# Patient Record
Sex: FEMALE | Race: WHITE | NOT HISPANIC OR LATINO | Employment: FULL TIME | URBAN - METROPOLITAN AREA
[De-identification: names, ages, dates, MRNs, and addresses within clinical notes are randomized per-mention and may not be internally consistent; named-entity substitution may affect disease eponyms.]

---

## 2018-01-16 ENCOUNTER — OFFICE VISIT (OUTPATIENT)
Dept: FAMILY MEDICINE CLINIC | Facility: CLINIC | Age: 33
End: 2018-01-16

## 2018-01-16 VITALS
DIASTOLIC BLOOD PRESSURE: 80 MMHG | HEIGHT: 64 IN | OXYGEN SATURATION: 98 % | SYSTOLIC BLOOD PRESSURE: 124 MMHG | BODY MASS INDEX: 42.85 KG/M2 | TEMPERATURE: 98 F | WEIGHT: 251 LBS | HEART RATE: 78 BPM

## 2018-01-16 PROBLEM — N39.0 RECURRENT UTI: Status: ACTIVE | Noted: 2018-01-16

## 2018-01-16 PROCEDURE — 99203 OFFICE O/P NEW LOW 30 MIN: CPT | Performed by: FAMILY MEDICINE

## 2018-01-16 NOTE — PROGRESS NOTES
Subjective   Danuta Queen is a 32 y.o. female.     Chief Complaint   Patient presents with   • Establish Care     new pt  establishing today in office   • Snoring     pt since last year is been having snoring and would like to check on that        HPI     Pt is a pleasant 32 YOF here Status care.  She has no prior medical history aside from the city.  She has a BMI of 43.  She states that she has gradually been gaining weight over the last 2 years.  Her greatest concern is snoring, she denies apnea, she denies daytime sleepiness.  She wakes up feeling that she had restorative sleep and does not wake up during the night.  She does affirm having a poor diet and not exercising.  She is aware changes she needs to make but has not done them.  She has never been pregnant, IUD in place for contraception.    The following portions of the patient's history were reviewed and updated as appropriate: allergies, current medications, past family history, past medical history, past social history, past surgical history and problem list.    Review of Systems   Constitutional: Negative for fever and unexpected weight change.   HENT: Negative for dental problem.    Respiratory: Negative for shortness of breath.    Cardiovascular: Negative for chest pain.   Gastrointestinal: Negative for blood in stool.   Genitourinary: Negative for dysuria.   Skin: Negative for rash.   Allergic/Immunologic: Negative for environmental allergies.   Neurological: Negative for syncope.   Psychiatric/Behavioral: The patient is not nervous/anxious.    All other systems reviewed and are negative.      Objective  Vitals:    01/16/18 1513   BP: 124/80   Pulse: 78   Temp: 98 °F (36.7 °C)   SpO2: 98%        Physical Exam   Constitutional: She is oriented to person, place, and time. She appears well-developed and well-nourished. No distress.   HENT:   Head: Normocephalic.   Nose: Nose normal.   Eyes: EOM are normal.   Cardiovascular: Normal rate, regular  rhythm, normal heart sounds and intact distal pulses.    No murmur heard.  Pulmonary/Chest: Effort normal and breath sounds normal. No respiratory distress.   Musculoskeletal: Normal range of motion.   Neurological: She is alert and oriented to person, place, and time.   Skin: Skin is warm and dry. No rash noted.   Psychiatric: She has a normal mood and affect. Her behavior is normal. Judgment and thought content normal.   Nursing note and vitals reviewed.        Current Outpatient Prescriptions:   •  levonorgestrel (MIRENA) 20 MCG/24HR IUD, by Intrauterine route., Disp: , Rfl:     Procedures    Lab Results (most recent)     None          Assessment/Plan   Danuta was seen today for establish care and snoring.    Diagnoses and all orders for this visit:    BMI 40.0-44.9, adult  -     Comprehensive Metabolic Panel  -     Lipid Panel  -     TSH Rfx On Abnormal To Free T4  -     Vitamin D 25 Hydroxy      Get baseline labwork as above for obesity, I do not think a sleep study is indicated at this time as she has had normal sleep patterns and does not wake up sleepy.  We discussed diet and exercise at length, patient seemed open but not ready to make changes at this time.    25 minutes was spent of the 30 minute visit in direct counseling and coordination of care regarding:   Encounter Diagnosis   Name Primary?   • BMI 40.0-44.9, adult Yes         Return in about 3 months (around 4/16/2018) for Recheck obesity .      Marjorie Owens MD

## 2018-01-19 LAB
25(OH)D3+25(OH)D2 SERPL-MCNC: 21.2 NG/ML (ref 30–100)
ALBUMIN SERPL-MCNC: 4.1 G/DL (ref 3.5–5.2)
ALBUMIN/GLOB SERPL: 1.4 G/DL
ALP SERPL-CCNC: 99 U/L (ref 39–117)
ALT SERPL-CCNC: 20 U/L (ref 1–33)
AST SERPL-CCNC: 18 U/L (ref 1–32)
BILIRUB SERPL-MCNC: 0.5 MG/DL (ref 0.1–1.2)
BUN SERPL-MCNC: 9 MG/DL (ref 6–20)
BUN/CREAT SERPL: 12.9 (ref 7–25)
CALCIUM SERPL-MCNC: 9.3 MG/DL (ref 8.6–10.5)
CHLORIDE SERPL-SCNC: 104 MMOL/L (ref 98–107)
CHOLEST SERPL-MCNC: 187 MG/DL (ref 0–200)
CO2 SERPL-SCNC: 24.8 MMOL/L (ref 22–29)
CREAT SERPL-MCNC: 0.7 MG/DL (ref 0.57–1)
GLOBULIN SER CALC-MCNC: 3 GM/DL
GLUCOSE SERPL-MCNC: 92 MG/DL (ref 65–99)
HDLC SERPL-MCNC: 64 MG/DL (ref 40–60)
LDLC SERPL CALC-MCNC: 105 MG/DL (ref 0–100)
POTASSIUM SERPL-SCNC: 4.5 MMOL/L (ref 3.5–5.2)
PROT SERPL-MCNC: 7.1 G/DL (ref 6–8.5)
SODIUM SERPL-SCNC: 143 MMOL/L (ref 136–145)
TRIGL SERPL-MCNC: 92 MG/DL (ref 0–150)
TSH SERPL DL<=0.005 MIU/L-ACNC: 1.72 MIU/ML (ref 0.27–4.2)
VLDLC SERPL CALC-MCNC: 18.4 MG/DL (ref 5–40)

## 2018-01-22 DIAGNOSIS — E55.9 VITAMIN D DEFICIENCY: Primary | ICD-10-CM

## 2018-02-27 ENCOUNTER — OFFICE VISIT (OUTPATIENT)
Dept: FAMILY MEDICINE CLINIC | Facility: CLINIC | Age: 33
End: 2018-02-27

## 2018-02-27 VITALS
TEMPERATURE: 98.6 F | DIASTOLIC BLOOD PRESSURE: 88 MMHG | SYSTOLIC BLOOD PRESSURE: 130 MMHG | HEART RATE: 113 BPM | OXYGEN SATURATION: 98 % | HEIGHT: 64 IN | BODY MASS INDEX: 42 KG/M2 | WEIGHT: 246 LBS

## 2018-02-27 DIAGNOSIS — H66.001 ACUTE SUPPURATIVE OTITIS MEDIA OF RIGHT EAR WITHOUT SPONTANEOUS RUPTURE OF TYMPANIC MEMBRANE, RECURRENCE NOT SPECIFIED: Primary | ICD-10-CM

## 2018-02-27 DIAGNOSIS — H60.331 ACUTE SWIMMER'S EAR OF RIGHT SIDE: ICD-10-CM

## 2018-02-27 PROCEDURE — 99213 OFFICE O/P EST LOW 20 MIN: CPT | Performed by: FAMILY MEDICINE

## 2018-02-27 RX ORDER — CIPROFLOXACIN AND DEXAMETHASONE 3; 1 MG/ML; MG/ML
4 SUSPENSION/ DROPS AURICULAR (OTIC) 2 TIMES DAILY
Qty: 10 ML | Refills: 0 | Status: SHIPPED | OUTPATIENT
Start: 2018-02-27 | End: 2018-03-05

## 2018-02-27 RX ORDER — AMOXICILLIN 875 MG/1
875 TABLET, COATED ORAL 2 TIMES DAILY
Qty: 20 TABLET | Refills: 0 | Status: SHIPPED | OUTPATIENT
Start: 2018-02-27 | End: 2018-03-09

## 2018-02-27 NOTE — PROGRESS NOTES
Subjective   Danuta Queen is a 32 y.o. female.     Chief Complaint   Patient presents with   • Sore Throat     since  saturday  has sore throat and yeasterday has some ear pain on right side some dizzy spells       URI    This is a new problem. Episode onset: 4 days. There has been no fever. Associated symptoms include coughing, ear pain, headaches, sinus pain, sneezing and a sore throat. Pertinent negatives include no nausea. Associated symptoms comments: R ear pain, entire face with sinus pressure that is improving. . She has tried decongestant (Mucinex, sudafed sinus headache) for the symptoms. The treatment provided mild relief.        The following portions of the patient's history were reviewed and updated as appropriate: allergies, current medications, past family history, past medical history, past social history, past surgical history and problem list.    Review of Systems   Constitutional: Negative for fatigue and fever.   HENT: Positive for ear pain, sinus pain, sneezing, sore throat and trouble swallowing.    Respiratory: Positive for cough.    Gastrointestinal: Negative for nausea.   Neurological: Positive for headaches.   All other systems reviewed and are negative.      Objective  Vitals:    02/27/18 1401   BP: 130/88   Pulse: 113   Temp: 98.6 °F (37 °C)   SpO2: 98%        Physical Exam   Constitutional: She is oriented to person, place, and time. She appears well-developed and well-nourished. No distress.   HENT:   Head: Normocephalic.   Left Ear: External ear normal.   Nose: Nose normal.   Bulging serous R TM, no perforation, erythema.  Ear canal red, irritated with some bleeding.    Eyes: EOM are normal.   Cardiovascular: Normal rate, regular rhythm, normal heart sounds and intact distal pulses.    No murmur heard.  Pulmonary/Chest: Effort normal and breath sounds normal. No respiratory distress.   Musculoskeletal: Normal range of motion.   Neurological: She is alert and oriented to person,  place, and time.   Skin: Skin is warm and dry. No rash noted.   Psychiatric: She has a normal mood and affect. Her behavior is normal. Judgment and thought content normal.   Nursing note and vitals reviewed.        Current Outpatient Prescriptions:   •  amoxicillin (AMOXIL) 875 MG tablet, Take 1 tablet by mouth 2 (Two) Times a Day for 10 days., Disp: 20 tablet, Rfl: 0  •  ciprofloxacin-dexamethasone (CIPRODEX) 0.3-0.1 % otic suspension, Administer 4 drops into ears 2 (Two) Times a Day. 4 drops in ear(s) BID for 7 days, Disp: 10 mL, Rfl: 0  •  levonorgestrel (MIRENA) 20 MCG/24HR IUD, by Intrauterine route., Disp: , Rfl:     Procedures    Lab Results (most recent)     None        [facility][  Assessment/Plan   Danuta was seen today for sore throat.    Diagnoses and all orders for this visit:    Acute suppurative otitis media of right ear without spontaneous rupture of tympanic membrane, recurrence not specified  -     amoxicillin (AMOXIL) 875 MG tablet; Take 1 tablet by mouth 2 (Two) Times a Day for 10 days.    Acute swimmer's ear of right side  -     ciprofloxacin-dexamethasone (CIPRODEX) 0.3-0.1 % otic suspension; Administer 4 drops into ears 2 (Two) Times a Day. 4 drops in ear(s) BID for 7 days      Otitis media and externa on the right side.  Treatment as above.  Follow-up in 2 weeks for reevaluation.  She does have some ringing although no overt hearing loss.    Return in about 2 weeks (around 3/13/2018) for Recheck otitis media .      Marjorie Owens MD

## 2018-03-05 ENCOUNTER — TELEPHONE (OUTPATIENT)
Dept: FAMILY MEDICINE CLINIC | Facility: CLINIC | Age: 33
End: 2018-03-05

## 2018-03-05 DIAGNOSIS — H60.391 OTHER INFECTIVE ACUTE OTITIS EXTERNA OF RIGHT EAR: Primary | ICD-10-CM

## 2018-03-05 RX ORDER — OFLOXACIN 3 MG/ML
5 SOLUTION AURICULAR (OTIC) DAILY
Qty: 5 ML | Refills: 0 | Status: SHIPPED | OUTPATIENT
Start: 2018-03-05 | End: 2018-03-12

## 2018-03-05 NOTE — TELEPHONE ENCOUNTER
Ms. Queen is on her sixth day of taking the amoxicillin. She has not seen any improvement. Ears are still achy. Patient could not afford the Ciprodex medication, it was over two hundred dollars. Is there anything else that can be prescribed for this patient?

## 2018-03-05 NOTE — TELEPHONE ENCOUNTER
Called in script for patient and I called her to give instructions to know how to use - pt verbalized understanding.  Patient to use Ofloxacin and then and then one pea-sized amount of triamcinolone  ointment in the right ear twice a day or 3 times a day.  If not improving in 4-5 days, the patient is instructed to follow-up with me.  I would need to reevaluate.  Potentially send to ENT depending on findings.  No need to call pt back at this time.

## 2018-03-09 ENCOUNTER — OFFICE VISIT (OUTPATIENT)
Dept: FAMILY MEDICINE CLINIC | Facility: CLINIC | Age: 33
End: 2018-03-09

## 2018-03-09 VITALS
TEMPERATURE: 98.9 F | SYSTOLIC BLOOD PRESSURE: 110 MMHG | HEART RATE: 89 BPM | OXYGEN SATURATION: 98 % | HEIGHT: 64 IN | DIASTOLIC BLOOD PRESSURE: 72 MMHG | BODY MASS INDEX: 41.66 KG/M2 | WEIGHT: 244 LBS

## 2018-03-09 DIAGNOSIS — H65.112 ACUTE MUCOID OTITIS MEDIA OF LEFT EAR: Primary | ICD-10-CM

## 2018-03-09 PROCEDURE — 99213 OFFICE O/P EST LOW 20 MIN: CPT | Performed by: FAMILY MEDICINE

## 2018-03-09 RX ORDER — PREDNISONE 20 MG/1
40 TABLET ORAL DAILY
Qty: 10 TABLET | Refills: 0 | Status: SHIPPED | OUTPATIENT
Start: 2018-03-09 | End: 2018-03-14

## 2018-03-09 RX ORDER — CEFDINIR 300 MG/1
300 CAPSULE ORAL 2 TIMES DAILY
Qty: 20 CAPSULE | Refills: 0 | Status: SHIPPED | OUTPATIENT
Start: 2018-03-09 | End: 2018-03-19

## 2018-03-09 NOTE — PROGRESS NOTES
Subjective   Danuta Queen is a 32 y.o. female.     Chief Complaint   Patient presents with   • Otitis Media     right ear no fgetting any better and has symptoms in left ear as well        Earache    There is pain in both ears. This is a new (not improved since last apt, L side now hurting as welll) problem. The problem occurs constantly. The problem has been unchanged. There has been no fever. Associated symptoms include coughing, ear discharge and hearing loss. Pertinent negatives include no neck pain, rhinorrhea or sore throat. She has tried antibiotics (Amoxacillin ) for the symptoms. The treatment provided no relief. There is no history of a chronic ear infection, hearing loss or a tympanostomy tube.        The following portions of the patient's history were reviewed and updated as appropriate: allergies, current medications, past family history, past medical history, past social history, past surgical history and problem list.    Review of Systems   Constitutional: Negative for fever.   HENT: Positive for congestion, ear discharge, ear pain and hearing loss. Negative for rhinorrhea and sore throat.    Respiratory: Positive for cough.    Musculoskeletal: Negative for neck pain.   All other systems reviewed and are negative.      Objective  Vitals:    03/09/18 1444   BP: 110/72   Pulse: 89   Temp: 98.9 °F (37.2 °C)   SpO2: 98%        Physical Exam   Constitutional: She is oriented to person, place, and time. She appears well-developed and well-nourished. No distress.   HENT:   Head: Normocephalic.   Nose: Nose normal.   Bilateral ear canals look significantly improved from last evaluation.  Right TM is not bulging, slightly red with slight maxime colored.  Left side with bulging and erythema.  Bony landmarks not visualized on the left.   Eyes: EOM are normal.   Cardiovascular: Normal rate, regular rhythm, normal heart sounds and intact distal pulses.    No murmur heard.  Pulmonary/Chest: Effort normal and  breath sounds normal. No respiratory distress.   Musculoskeletal: Normal range of motion.   Neurological: She is alert and oriented to person, place, and time.   Skin: Skin is warm and dry. No rash noted.   Psychiatric: She has a normal mood and affect. Her behavior is normal. Judgment and thought content normal.   Nursing note and vitals reviewed.        Current Outpatient Prescriptions:   •  ofloxacin (FLOXIN) 0.3 % otic solution, Administer 5 drops to the right ear Daily for 7 days., Disp: 5 mL, Rfl: 0  •  triamcinolone (KENALOG) 0.1 % ointment, Apply  topically 3 (Three) Times a Day., Disp: 15 g, Rfl: 0  •  amoxicillin (AMOXIL) 875 MG tablet, Take 1 tablet by mouth 2 (Two) Times a Day for 10 days., Disp: 20 tablet, Rfl: 0  •  cefdinir (OMNICEF) 300 MG capsule, Take 1 capsule by mouth 2 (Two) Times a Day for 10 days., Disp: 20 capsule, Rfl: 0  •  levonorgestrel (MIRENA) 20 MCG/24HR IUD, by Intrauterine route., Disp: , Rfl:   •  predniSONE (DELTASONE) 20 MG tablet, Take 2 tablets by mouth Daily for 5 days., Disp: 10 tablet, Rfl: 0    Procedures    Lab Results (most recent)     None          Assessment/Plan   Danuta was seen today for otitis media.    Diagnoses and all orders for this visit:    Acute mucoid otitis media of left ear  -     cefdinir (OMNICEF) 300 MG capsule; Take 1 capsule by mouth 2 (Two) Times a Day for 10 days.  -     predniSONE (DELTASONE) 20 MG tablet; Take 2 tablets by mouth Daily for 5 days.      Patient with now left-sided otitis media, start Cefdinir.  Prednisone for fluid in the ear and hearing loss.  Patient will follow up if not improving, may need referral to ENT if not improving after second course of antibiotics.    Return if symptoms worsen or fail to improve.      Marjorie Owens MD

## 2018-03-15 ENCOUNTER — TELEPHONE (OUTPATIENT)
Dept: FAMILY MEDICINE CLINIC | Facility: CLINIC | Age: 33
End: 2018-03-15

## 2018-03-15 DIAGNOSIS — H66.90 SUBACUTE OTITIS MEDIA, UNSPECIFIED OTITIS MEDIA TYPE: Primary | ICD-10-CM

## 2018-03-15 NOTE — TELEPHONE ENCOUNTER
Pt is not having any improvement with the antibiotics. Her inner ear is still full of pain and pressure and her hearing is very fussy. Is a ENT the next step?

## 2018-03-27 ENCOUNTER — RESULTS ENCOUNTER (OUTPATIENT)
Dept: FAMILY MEDICINE CLINIC | Facility: CLINIC | Age: 33
End: 2018-03-27

## 2018-03-27 DIAGNOSIS — E55.9 VITAMIN D DEFICIENCY: ICD-10-CM

## 2019-01-02 ENCOUNTER — OFFICE VISIT (OUTPATIENT)
Dept: FAMILY MEDICINE CLINIC | Facility: CLINIC | Age: 34
End: 2019-01-02

## 2019-01-02 VITALS
DIASTOLIC BLOOD PRESSURE: 86 MMHG | WEIGHT: 252 LBS | BODY MASS INDEX: 43.02 KG/M2 | SYSTOLIC BLOOD PRESSURE: 126 MMHG | OXYGEN SATURATION: 98 % | HEART RATE: 89 BPM | TEMPERATURE: 98.7 F | HEIGHT: 64 IN

## 2019-01-02 DIAGNOSIS — R31.0 GROSS HEMATURIA: Primary | ICD-10-CM

## 2019-01-02 PROCEDURE — 99214 OFFICE O/P EST MOD 30 MIN: CPT | Performed by: FAMILY MEDICINE

## 2019-01-02 NOTE — PROGRESS NOTES
Danuta Queen is a 33 y.o. female.     Chief Complaint   Patient presents with   • Blood in Urine     since friday saw blood in urine went to  everthing was ok thy did culture came back negative andUA negative as well        HPI     Pt is a pleasant 33 y.o. YO female here for hematuria.     Started having hematuria 5 days ago, gross hematuria with some mild flank pain,, dysuria, N/v/f or other symptoms.  Not a smoker, no hx of malignancy in the family.  No trauma/ cause for hematuria. She has had a history of uti in the past with hematuria - went to [facility] and treated with Macrobid, urine culture negative.     The following portions of the patient's history were reviewed and updated as appropriate: allergies, current medications, past family history, past medical history, past social history, past surgical history and problem list.    Review of Systems   Constitutional: Negative.    Eyes: Negative.    Respiratory: Negative.    Cardiovascular: Negative.    Genitourinary: Positive for difficulty urinating, dysuria, hematuria and urgency.   Musculoskeletal: Positive for back pain.   Allergic/Immunologic: Negative.    Neurological: Negative.    Hematological: Negative.    Psychiatric/Behavioral: Negative.        Objective  Vitals:    01/02/19 1504   BP: 126/86   Pulse: 89   Temp: 98.7 °F (37.1 °C)   SpO2: 98%        Physical Exam   Constitutional: She is oriented to person, place, and time. She appears well-developed and well-nourished. No distress.   HENT:   Head: Normocephalic.   Nose: Nose normal.   Eyes: EOM are normal. No scleral icterus.   Pulmonary/Chest: Effort normal. No respiratory distress.   Abdominal: Soft. There is tenderness.   Mild suprapubic tenderness.   Musculoskeletal: Normal range of motion.   Mild tenderness on the right side of the back below the CV region.   Neurological: She is alert and oriented to person, place, and time.   Skin: Skin is warm and dry. No rash noted.   Psychiatric: She  has a normal mood and affect. Her behavior is normal. Judgment and thought content normal.   Nursing note and vitals reviewed.        Current Outpatient Medications:   •  levonorgestrel (MIRENA) 20 MCG/24HR IUD, by Intrauterine route., Disp: , Rfl:   •  nitrofurantoin, macrocrystal-monohydrate, (MACROBID) 100 MG capsule, Take 1 capsule by mouth 2 (Two) Times a Day., Disp: 14 capsule, Rfl: 0  •  phenazopyridine (PYRIDIUM) 200 MG tablet, Take 1 tablet by mouth 3 (Three) Times a Day As Needed for bladder spasms., Disp: 6 tablet, Rfl: 0  •  triamcinolone (KENALOG) 0.1 % ointment, Apply  topically 3 (Three) Times a Day., Disp: 15 g, Rfl: 0    Procedures    Lab Results (most recent)     None        No visits with results within 1 Day(s) from this visit.   Latest known visit with results is:   Admission on 12/29/2018, Discharged on 12/29/2018   Component Date Value Ref Range Status   • Color 12/29/2018 Other* Yellow, Straw, Dark Yellow, Gini Final   • Clarity, UA 12/29/2018 Slightly Cloudy* Clear Final   • Glucose, UA 12/29/2018 Negative  Negative, 1000 mg/dL (3+) mg/dL Final   • Bilirubin 12/29/2018 Negative  Negative Final   • Ketones, UA 12/29/2018 Negative  Negative Final   • Specific Gravity  12/29/2018 1.025  1.005 - 1.030 Final   • Blood, UA 12/29/2018 Large* Negative Final   • pH, Urine 12/29/2018 6.5  5.0 - 8.0 Final   • Protein, POC 12/29/2018 100 mg/dL* Negative mg/dL Final   • Urobilinogen, UA 12/29/2018 Normal  Normal Final   • Nitrite, UA 12/29/2018 Negative  Negative Final   • Leukocytes 12/29/2018 Trace* Negative Final   • Urine Culture 12/29/2018 Final report   Final   • Result 1 12/29/2018 Comment   Final    Culture shows less than 10,000 colony forming units of bacteria per  milliliter of urine. This colony count is not generally considered  to be clinically significant.           Danuta was seen today for blood in urine.    Diagnoses and all orders for this visit:    Gross hematuria  -     CT Abdomen  Pelvis Stone Protocol; Future  -     Urine Culture - Urine, Urine, Clean Catch    Patient with new problem of gross hematuria for 5 days with mild pain on the right side of the back, likely nephrolithiasis.  However her pain is very mild.  Will get CT abdomen to further evaluate as well as get another urine culture as she has had UTIs present like this in the past.  Continue course of Macrobid, patient aware that you culture will likely be negative.  If not improving with that we'll get further evaluation.  Follow-up in 2 weeks for another UA.  IUD in place.       Return in about 2 weeks (around 1/16/2019), or if symptoms worsen or fail to improve, for Recheck hematuria .      Marjorie Owens MD

## 2019-01-04 LAB
BACTERIA UR CULT: NORMAL
BACTERIA UR CULT: NORMAL

## 2019-01-07 DIAGNOSIS — N20.0 NEPHROLITHIASIS: Primary | ICD-10-CM

## 2019-01-07 DIAGNOSIS — N13.2 HYDRONEPHROSIS WITH URINARY OBSTRUCTION DUE TO RENAL CALCULUS: ICD-10-CM

## 2019-01-08 LAB
BUN SERPL-MCNC: 9 MG/DL (ref 6–20)
BUN/CREAT SERPL: 15.3 (ref 7–25)
CALCIUM SERPL-MCNC: 9.4 MG/DL (ref 8.6–10.5)
CHLORIDE SERPL-SCNC: 103 MMOL/L (ref 98–107)
CO2 SERPL-SCNC: 23.7 MMOL/L (ref 22–29)
CREAT SERPL-MCNC: 0.59 MG/DL (ref 0.57–1)
GLUCOSE SERPL-MCNC: 74 MG/DL (ref 65–99)
POTASSIUM SERPL-SCNC: 3.9 MMOL/L (ref 3.5–5.2)
SODIUM SERPL-SCNC: 138 MMOL/L (ref 136–145)

## 2019-01-09 ENCOUNTER — TELEPHONE (OUTPATIENT)
Dept: FAMILY MEDICINE CLINIC | Facility: CLINIC | Age: 34
End: 2019-01-09

## 2019-01-09 NOTE — TELEPHONE ENCOUNTER
----- Message from Marjorie Owens MD sent at 1/8/2019  4:30 PM EST -----  Please call patient back with results.  The labs has resulted as normal kidney function, please FU with urology as we discussed previously.    Thank you

## 2019-07-15 ENCOUNTER — OFFICE VISIT (OUTPATIENT)
Dept: FAMILY MEDICINE CLINIC | Facility: CLINIC | Age: 34
End: 2019-07-15

## 2019-07-15 VITALS
OXYGEN SATURATION: 98 % | HEIGHT: 64 IN | HEART RATE: 52 BPM | DIASTOLIC BLOOD PRESSURE: 78 MMHG | SYSTOLIC BLOOD PRESSURE: 124 MMHG | WEIGHT: 240 LBS | TEMPERATURE: 97.4 F | BODY MASS INDEX: 40.97 KG/M2

## 2019-07-15 DIAGNOSIS — Z00.00 HEALTH MAINTENANCE EXAMINATION: Primary | ICD-10-CM

## 2019-07-15 DIAGNOSIS — E88.81 METABOLIC SYNDROME: ICD-10-CM

## 2019-07-15 DIAGNOSIS — E55.9 VITAMIN D DEFICIENCY: ICD-10-CM

## 2019-07-15 PROBLEM — Z97.5 IUD CONTRACEPTION: Status: ACTIVE | Noted: 2018-12-13

## 2019-07-15 PROCEDURE — 99395 PREV VISIT EST AGE 18-39: CPT | Performed by: FAMILY MEDICINE

## 2019-07-15 RX ORDER — MULTIPLE VITAMINS W/ MINERALS TAB 9MG-400MCG
1 TAB ORAL DAILY
COMMUNITY

## 2019-07-15 NOTE — PROGRESS NOTES
Danuta Queen is a 34 y.o. female.     Chief Complaint   Patient presents with   • Annual Exam     no complains       HPI     Pt is a pleasant 34 y.o. YO female here for Annual Exam.      Health Maintenance   Topic Date Due   • ANNUAL PHYSICAL  06/16/1986   • INFLUENZA VACCINE  08/01/2019   • PAP SMEAR  12/01/2021   • TDAP/TD VACCINES (2 - Td) 05/13/2023     Diet: started a diet with low carb diet, veggies, calorie restriction (1500/day)  Exercise: walking and intervals.      The following portions of the patient's history were reviewed and updated as appropriate: allergies, current medications, past family history, past medical history, past social history, past surgical history and problem list.    Review of Systems   Constitutional: Negative.    HENT: Negative.    Eyes: Negative.    Respiratory: Negative.    Cardiovascular: Negative.    Gastrointestinal: Negative.    Endocrine: Negative.    Genitourinary: Negative.    Musculoskeletal: Negative.    Skin: Negative.    Allergic/Immunologic: Negative.    Neurological: Negative.    Hematological: Negative.    Psychiatric/Behavioral: Negative.        Objective  Vitals:    07/15/19 0759   BP: 124/78   Pulse: 52   Temp: 97.4 °F (36.3 °C)   SpO2: 98%        Physical Exam   Constitutional: She is oriented to person, place, and time. She appears well-developed and well-nourished. No distress.   HENT:   Head: Normocephalic.   Nose: Nose normal.   Eyes: EOM are normal.   Cardiovascular: Normal rate, regular rhythm, normal heart sounds and intact distal pulses.   No murmur heard.  Pulmonary/Chest: Effort normal and breath sounds normal. No respiratory distress.   Musculoskeletal: Normal range of motion.   Neurological: She is alert and oriented to person, place, and time.   Skin: Skin is warm and dry. No rash noted.   Psychiatric: She has a normal mood and affect. Her behavior is normal. Judgment and thought content normal.   Nursing note and vitals  reviewed.        Current Outpatient Medications:   •  levonorgestrel (MIRENA) 20 MCG/24HR IUD, by Intrauterine route., Disp: , Rfl:   •  Multiple Vitamins-Minerals (MULTIVITAMIN WITH MINERALS) tablet tablet, Take 1 tablet by mouth Daily., Disp: , Rfl:     Procedures    Lab Results (most recent)     None              Danuta was seen today for annual exam.    Diagnoses and all orders for this visit:    Health maintenance examination    Vitamin D deficiency  -     Vitamin D 25 Hydroxy    Metabolic syndrome  -     Lipid Panel  -     TSH Rfx On Abnormal To Free T4  -     Comprehensive Metabolic Panel      Annual Exam - UTD, labs as above.  Discussed increasing caloric intake daily.  Recommended only a 500-calorie deficit from her maintenance caloric weight and doing intervals of 20 pound loss.  She would ultimately like to weigh around 150 pounds.  She and her  are doing this together.  We discussed that it would be wise to make smaller intervals of change.  She is walking and exercising, GYN health up-to-date, immunizations up-to-date.  She does have an IUD in place for contraception.  Doing well otherwise.    Return if symptoms worsen or fail to improve.      Marjorie Owens MD

## 2019-07-16 LAB
25(OH)D3+25(OH)D2 SERPL-MCNC: 30.5 NG/ML (ref 30–100)
ALBUMIN SERPL-MCNC: 4.5 G/DL (ref 3.5–5.2)
ALBUMIN/GLOB SERPL: 2.3 G/DL
ALP SERPL-CCNC: 113 U/L (ref 39–117)
ALT SERPL-CCNC: 25 U/L (ref 1–33)
AST SERPL-CCNC: 19 U/L (ref 1–32)
BILIRUB SERPL-MCNC: 0.3 MG/DL (ref 0.2–1.2)
BUN SERPL-MCNC: 13 MG/DL (ref 6–20)
BUN/CREAT SERPL: 23.2 (ref 7–25)
CALCIUM SERPL-MCNC: 8.7 MG/DL (ref 8.6–10.5)
CHLORIDE SERPL-SCNC: 104 MMOL/L (ref 98–107)
CHOLEST SERPL-MCNC: 157 MG/DL (ref 0–200)
CO2 SERPL-SCNC: 23.6 MMOL/L (ref 22–29)
CREAT SERPL-MCNC: 0.56 MG/DL (ref 0.57–1)
GLOBULIN SER CALC-MCNC: 2 GM/DL
GLUCOSE SERPL-MCNC: 86 MG/DL (ref 65–99)
HDLC SERPL-MCNC: 50 MG/DL (ref 40–60)
LDLC SERPL CALC-MCNC: 93 MG/DL (ref 0–100)
POTASSIUM SERPL-SCNC: 4.5 MMOL/L (ref 3.5–5.2)
PROT SERPL-MCNC: 6.5 G/DL (ref 6–8.5)
SODIUM SERPL-SCNC: 141 MMOL/L (ref 136–145)
TRIGL SERPL-MCNC: 69 MG/DL (ref 0–150)
TSH SERPL DL<=0.005 MIU/L-ACNC: 2.01 MIU/ML (ref 0.27–4.2)
VLDLC SERPL CALC-MCNC: 13.8 MG/DL

## 2019-07-22 NOTE — PROGRESS NOTES
Please call patient back with results.  The labs has resulted as normal, normal thyroid function, normal cholesterol.  Vitamin D has improved to normal limits but I would still recommend taking at least 2000 units of vitamin D3 daily.    Thank you

## 2021-06-28 ENCOUNTER — OFFICE VISIT (OUTPATIENT)
Dept: FAMILY MEDICINE CLINIC | Facility: CLINIC | Age: 36
End: 2021-06-28

## 2021-06-28 VITALS
BODY MASS INDEX: 40.97 KG/M2 | SYSTOLIC BLOOD PRESSURE: 118 MMHG | WEIGHT: 240 LBS | HEART RATE: 98 BPM | TEMPERATURE: 98.7 F | HEIGHT: 64 IN | DIASTOLIC BLOOD PRESSURE: 76 MMHG | OXYGEN SATURATION: 100 %

## 2021-06-28 DIAGNOSIS — Z00.00 HEALTH MAINTENANCE EXAMINATION: Primary | ICD-10-CM

## 2021-06-28 DIAGNOSIS — Z13.29 SCREENING FOR THYROID DISORDER: ICD-10-CM

## 2021-06-28 DIAGNOSIS — Z13.1 SCREENING FOR DIABETES MELLITUS: ICD-10-CM

## 2021-06-28 DIAGNOSIS — Z13.0 SCREENING, ANEMIA, DEFICIENCY, IRON: ICD-10-CM

## 2021-06-28 DIAGNOSIS — E55.9 HYPOVITAMINOSIS D: ICD-10-CM

## 2021-06-28 DIAGNOSIS — Z13.220 SCREENING FOR LIPID DISORDERS: ICD-10-CM

## 2021-06-28 PROBLEM — E66.9 OBESITY: Status: ACTIVE | Noted: 2021-01-28

## 2021-06-28 PROCEDURE — 99395 PREV VISIT EST AGE 18-39: CPT | Performed by: FAMILY MEDICINE

## 2021-06-28 RX ORDER — ACETAMINOPHEN 160 MG
TABLET,DISINTEGRATING ORAL
COMMUNITY
Start: 2021-01-01 | End: 2023-02-20

## 2021-06-28 NOTE — PROGRESS NOTES
"Chief Complaint  Annual Exam (no complains )    Subjective          Danuta Queen presents to [facility]  History of Present Illness  Annual Exam.      Health Maintenance   Topic Date Due   • HEPATITIS C SCREENING  Never done   • ANNUAL PHYSICAL  07/16/2020   • INFLUENZA VACCINE  08/01/2021   • PAP SMEAR  12/01/2022   • TDAP/TD VACCINES (2 - Td or Tdap) 05/13/2023   • COVID-19 Vaccine  Completed   • Pneumococcal Vaccine 0-64  Aged Out       Diet: has done low carb, hard to maintain, now doing portion control and calorie counting   Exercise: Walking, going outside more   GYN: neg co-testing in 2019 - IUD, Mirena   Immunizations: UTD   Colon cancer screening: UTD   Sleep/mental health: working from home, getting better     Objective   Vital Signs:   /76   Pulse 98   Temp 98.7 °F (37.1 °C)   Ht 162.6 cm (64\")   Wt 109 kg (240 lb)   SpO2 100%   BMI 41.20 kg/m²     Physical Exam  Vitals and nursing note reviewed.   Constitutional:       General: She is not in acute distress.     Appearance: She is well-developed.   HENT:      Head: Normocephalic.      Nose: Nose normal.   Cardiovascular:      Rate and Rhythm: Normal rate and regular rhythm.      Heart sounds: Normal heart sounds. No murmur heard.     Pulmonary:      Effort: Pulmonary effort is normal. No respiratory distress.      Breath sounds: Normal breath sounds.   Musculoskeletal:         General: Normal range of motion.   Skin:     General: Skin is warm and dry.      Findings: No rash.   Neurological:      Mental Status: She is alert and oriented to person, place, and time.   Psychiatric:         Behavior: Behavior normal.         Thought Content: Thought content normal.         Judgment: Judgment normal.        Result Review :                 Assessment and Plan    Diagnoses and all orders for this visit:    1. Health maintenance examination (Primary)    2. Screening for diabetes mellitus  -     Comprehensive " Metabolic Panel    3. Screening for lipid disorders  -     Lipid Panel    4. Screening, anemia, deficiency, iron  -     CBC & Differential    5. Screening for thyroid disorder  -     TSH Rfx On Abnormal To Free T4    6. Hypovitaminosis D  -     Vitamin D 25 Hydroxy    Here for annual exam, fasting labs as above, immunizations up-to-date, colon cancer screening not needed, GYN health UTD with GYN , cardiovascular screening UTD, counseled patient to increase vegetable intake, water and 150 minutes of exercise weekly combining weightbearing exercises and aerobic activity.  She has lost 10 pounds since last year, working to maintain consistent calories in order to continue with weight loss.    Cough -nocturnal, wakes up with congestion in the morning.  I think likely GERD.  Start with OTC Prilosec and follow-up if not improving.      Follow Up   Return in about 1 year (around 6/28/2022), or if symptoms worsen or fail to improve, for Annual Exam with fasting labs prior.  Patient was given instructions and counseling regarding her condition or for health maintenance advice. Please see specific information pulled into the AVS if appropriate. Medical assistant and I wore mask and eyewear protection during entire encounter.  Patient wore mask.    Marjorie Owens MD

## 2021-07-09 LAB
25(OH)D3+25(OH)D2 SERPL-MCNC: 37 NG/ML (ref 30–100)
ALBUMIN SERPL-MCNC: 4.1 G/DL (ref 3.5–5.2)
ALBUMIN/GLOB SERPL: 1.7 G/DL
ALP SERPL-CCNC: 92 U/L (ref 39–117)
ALT SERPL-CCNC: 31 U/L (ref 1–33)
AST SERPL-CCNC: 24 U/L (ref 1–32)
BASOPHILS # BLD AUTO: 0.05 10*3/MM3 (ref 0–0.2)
BASOPHILS NFR BLD AUTO: 0.7 % (ref 0–1.5)
BILIRUB SERPL-MCNC: 0.3 MG/DL (ref 0–1.2)
BUN SERPL-MCNC: 9 MG/DL (ref 6–20)
BUN/CREAT SERPL: 15.5 (ref 7–25)
CALCIUM SERPL-MCNC: 9.1 MG/DL (ref 8.6–10.5)
CHLORIDE SERPL-SCNC: 104 MMOL/L (ref 98–107)
CHOLEST SERPL-MCNC: 192 MG/DL (ref 0–200)
CO2 SERPL-SCNC: 24.9 MMOL/L (ref 22–29)
CREAT SERPL-MCNC: 0.58 MG/DL (ref 0.57–1)
EOSINOPHIL # BLD AUTO: 0.11 10*3/MM3 (ref 0–0.4)
EOSINOPHIL NFR BLD AUTO: 1.6 % (ref 0.3–6.2)
ERYTHROCYTE [DISTWIDTH] IN BLOOD BY AUTOMATED COUNT: 12.6 % (ref 12.3–15.4)
GLOBULIN SER CALC-MCNC: 2.4 GM/DL
GLUCOSE SERPL-MCNC: 86 MG/DL (ref 65–99)
HCT VFR BLD AUTO: 42.6 % (ref 34–46.6)
HDLC SERPL-MCNC: 57 MG/DL (ref 40–60)
HGB BLD-MCNC: 14.2 G/DL (ref 12–15.9)
IMM GRANULOCYTES # BLD AUTO: 0.02 10*3/MM3 (ref 0–0.05)
IMM GRANULOCYTES NFR BLD AUTO: 0.3 % (ref 0–0.5)
LDLC SERPL CALC-MCNC: 114 MG/DL (ref 0–100)
LYMPHOCYTES # BLD AUTO: 1.43 10*3/MM3 (ref 0.7–3.1)
LYMPHOCYTES NFR BLD AUTO: 21.2 % (ref 19.6–45.3)
MCH RBC QN AUTO: 32.1 PG (ref 26.6–33)
MCHC RBC AUTO-ENTMCNC: 33.3 G/DL (ref 31.5–35.7)
MCV RBC AUTO: 96.2 FL (ref 79–97)
MONOCYTES # BLD AUTO: 0.34 10*3/MM3 (ref 0.1–0.9)
MONOCYTES NFR BLD AUTO: 5 % (ref 5–12)
NEUTROPHILS # BLD AUTO: 4.8 10*3/MM3 (ref 1.7–7)
NEUTROPHILS NFR BLD AUTO: 71.2 % (ref 42.7–76)
NRBC BLD AUTO-RTO: 0 /100 WBC (ref 0–0.2)
PLATELET # BLD AUTO: 348 10*3/MM3 (ref 140–450)
POTASSIUM SERPL-SCNC: 4.4 MMOL/L (ref 3.5–5.2)
PROT SERPL-MCNC: 6.5 G/DL (ref 6–8.5)
RBC # BLD AUTO: 4.43 10*6/MM3 (ref 3.77–5.28)
SODIUM SERPL-SCNC: 137 MMOL/L (ref 136–145)
TRIGL SERPL-MCNC: 117 MG/DL (ref 0–150)
TSH SERPL DL<=0.005 MIU/L-ACNC: 1.87 UIU/ML (ref 0.27–4.2)
VLDLC SERPL CALC-MCNC: 21 MG/DL (ref 5–40)
WBC # BLD AUTO: 6.75 10*3/MM3 (ref 3.4–10.8)

## 2021-07-15 ENCOUNTER — TRANSCRIBE ORDERS (OUTPATIENT)
Dept: ADMINISTRATIVE | Facility: HOSPITAL | Age: 36
End: 2021-07-15

## 2021-07-15 ENCOUNTER — TELEPHONE (OUTPATIENT)
Dept: FAMILY MEDICINE CLINIC | Facility: CLINIC | Age: 36
End: 2021-07-15

## 2021-07-15 DIAGNOSIS — N13.30 HYDRONEPHROSIS, UNSPECIFIED HYDRONEPHROSIS TYPE: Primary | ICD-10-CM

## 2021-07-15 NOTE — TELEPHONE ENCOUNTER
Caller: Danuta Queen    Relationship: Self    Best call back number: [phone]       What was the call regarding: PATIENT CALLED AND STATED SHE NOTICED ON MYCHART THAT SHE IS SCHEDULED FOR AN MRI, BUT DOES NOT KNOW WHY     Do you require a callback: YES

## 2021-08-05 ENCOUNTER — HOSPITAL ENCOUNTER (OUTPATIENT)
Dept: MRI IMAGING | Facility: HOSPITAL | Age: 36
End: 2021-08-05

## 2022-06-28 DIAGNOSIS — Z13.220 SCREENING FOR LIPID DISORDERS: ICD-10-CM

## 2022-06-28 DIAGNOSIS — Z13.0 SCREENING, ANEMIA, DEFICIENCY, IRON: ICD-10-CM

## 2022-06-28 DIAGNOSIS — Z00.00 HEALTH MAINTENANCE EXAMINATION: ICD-10-CM

## 2022-06-28 DIAGNOSIS — Z13.29 SCREENING FOR THYROID DISORDER: ICD-10-CM

## 2022-06-28 DIAGNOSIS — E55.9 HYPOVITAMINOSIS D: ICD-10-CM

## 2022-06-28 DIAGNOSIS — Z13.1 SCREENING FOR DIABETES MELLITUS: Primary | ICD-10-CM

## 2022-07-08 LAB
25(OH)D3+25(OH)D2 SERPL-MCNC: 41.8 NG/ML (ref 30–100)
ALBUMIN SERPL-MCNC: 4.4 G/DL (ref 3.8–4.8)
ALBUMIN/GLOB SERPL: 1.5 {RATIO} (ref 1.2–2.2)
ALP SERPL-CCNC: 110 IU/L (ref 44–121)
ALT SERPL-CCNC: 45 IU/L (ref 0–32)
AST SERPL-CCNC: 34 IU/L (ref 0–40)
BASOPHILS # BLD AUTO: 0.1 X10E3/UL (ref 0–0.2)
BASOPHILS NFR BLD AUTO: 1 %
BILIRUB SERPL-MCNC: 0.6 MG/DL (ref 0–1.2)
BUN SERPL-MCNC: 11 MG/DL (ref 6–20)
BUN/CREAT SERPL: 16 (ref 9–23)
CALCIUM SERPL-MCNC: 9.6 MG/DL (ref 8.7–10.2)
CHLORIDE SERPL-SCNC: 105 MMOL/L (ref 96–106)
CHOLEST SERPL-MCNC: 209 MG/DL (ref 100–199)
CO2 SERPL-SCNC: 23 MMOL/L (ref 20–29)
CREAT SERPL-MCNC: 0.69 MG/DL (ref 0.57–1)
EGFRCR SERPLBLD CKD-EPI 2021: 113 ML/MIN/1.73
EOSINOPHIL # BLD AUTO: 0.1 X10E3/UL (ref 0–0.4)
EOSINOPHIL NFR BLD AUTO: 2 %
ERYTHROCYTE [DISTWIDTH] IN BLOOD BY AUTOMATED COUNT: 12.6 % (ref 11.7–15.4)
FT4I SERPL CALC-MCNC: 1.8 (ref 1.2–4.9)
GLOBULIN SER CALC-MCNC: 2.9 G/DL (ref 1.5–4.5)
GLUCOSE SERPL-MCNC: 88 MG/DL (ref 65–99)
HCT VFR BLD AUTO: 44.4 % (ref 34–46.6)
HDLC SERPL-MCNC: 57 MG/DL
HGB BLD-MCNC: 14.6 G/DL (ref 11.1–15.9)
IMM GRANULOCYTES # BLD AUTO: 0 X10E3/UL (ref 0–0.1)
IMM GRANULOCYTES NFR BLD AUTO: 0 %
LDLC SERPL CALC-MCNC: 130 MG/DL (ref 0–99)
LDLC/HDLC SERPL: 2.3 RATIO (ref 0–3.2)
LYMPHOCYTES # BLD AUTO: 1.6 X10E3/UL (ref 0.7–3.1)
LYMPHOCYTES NFR BLD AUTO: 23 %
MCH RBC QN AUTO: 31.4 PG (ref 26.6–33)
MCHC RBC AUTO-ENTMCNC: 32.9 G/DL (ref 31.5–35.7)
MCV RBC AUTO: 96 FL (ref 79–97)
MONOCYTES # BLD AUTO: 0.4 X10E3/UL (ref 0.1–0.9)
MONOCYTES NFR BLD AUTO: 5 %
NEUTROPHILS # BLD AUTO: 4.8 X10E3/UL (ref 1.4–7)
NEUTROPHILS NFR BLD AUTO: 69 %
PLATELET # BLD AUTO: 319 X10E3/UL (ref 150–450)
POTASSIUM SERPL-SCNC: 5.1 MMOL/L (ref 3.5–5.2)
PROT SERPL-MCNC: 7.3 G/DL (ref 6–8.5)
RBC # BLD AUTO: 4.65 X10E6/UL (ref 3.77–5.28)
SODIUM SERPL-SCNC: 141 MMOL/L (ref 134–144)
T3RU NFR SERPL: 24 % (ref 24–39)
T4 SERPL-MCNC: 7.3 UG/DL (ref 4.5–12)
TRIGL SERPL-MCNC: 123 MG/DL (ref 0–149)
TSH SERPL DL<=0.005 MIU/L-ACNC: 2.28 UIU/ML (ref 0.45–4.5)
VLDLC SERPL CALC-MCNC: 22 MG/DL (ref 5–40)
WBC # BLD AUTO: 6.9 X10E3/UL (ref 3.4–10.8)

## 2022-07-14 ENCOUNTER — OFFICE VISIT (OUTPATIENT)
Dept: FAMILY MEDICINE CLINIC | Facility: CLINIC | Age: 37
End: 2022-07-14

## 2022-07-14 VITALS
DIASTOLIC BLOOD PRESSURE: 74 MMHG | TEMPERATURE: 98.7 F | SYSTOLIC BLOOD PRESSURE: 122 MMHG | HEIGHT: 64 IN | BODY MASS INDEX: 43.02 KG/M2 | HEART RATE: 82 BPM | WEIGHT: 252 LBS | OXYGEN SATURATION: 100 %

## 2022-07-14 DIAGNOSIS — Z11.59 ENCOUNTER FOR HEPATITIS C SCREENING TEST FOR LOW RISK PATIENT: ICD-10-CM

## 2022-07-14 DIAGNOSIS — R74.8 ELEVATED LIVER ENZYMES: ICD-10-CM

## 2022-07-14 DIAGNOSIS — Z00.00 HEALTH MAINTENANCE EXAMINATION: Primary | ICD-10-CM

## 2022-07-14 PROBLEM — K80.20 CALCULUS OF GALLBLADDER WITHOUT CHOLECYSTITIS WITHOUT OBSTRUCTION: Status: ACTIVE | Noted: 2022-07-14

## 2022-07-14 PROCEDURE — 99395 PREV VISIT EST AGE 18-39: CPT | Performed by: FAMILY MEDICINE

## 2022-07-14 NOTE — PROGRESS NOTES
"Chief Complaint  Annual Exam (No complains doing well )    Subjective        Danuta Queen presents to [facility] PRIMARY [facility]ARE  History of Present Illness  Annual Exam.    Health Maintenance   Topic Date Due   • HEPATITIS C SCREENING  Never done   • ANNUAL PHYSICAL  06/29/2022   • INFLUENZA VACCINE  10/01/2022   • PAP SMEAR  12/01/2022   • TDAP/TD VACCINES (2 - Td or Tdap) 05/13/2023   • COVID-19 Vaccine  Completed   • Pneumococcal Vaccine 0-64  Aged Out       Diet: Eating healthier and working to eat more whole foods, more fresh veggies, coking well.   Exercise: not exercising well   GYN: UTD  Immunizations: UTD   Colon cancer screening: doing well   Sleep/mental health: doing well, planning a fun vacation  Dentist: UTD every 6 months   Derm: UTD    Objective   Vital Signs:  /74   Pulse 82   Temp 98.7 °F (37.1 °C)   Ht 162.6 cm (64\")   Wt 114 kg (252 lb)   SpO2 100%   BMI 43.26 kg/m²   Estimated body mass index is 43.26 kg/m² as calculated from the following:    Height as of this encounter: 162.6 cm (64\").    Weight as of this encounter: 114 kg (252 lb).          Physical Exam  Vitals reviewed.   Constitutional:       General: She is not in acute distress.     Appearance: Normal appearance. She is well-developed. She is obese. She is not diaphoretic.   HENT:      Head: Normocephalic and atraumatic. Hair is normal.      Right Ear: Hearing, tympanic membrane, ear canal and external ear normal. No decreased hearing noted. No drainage.      Left Ear: Hearing, tympanic membrane, ear canal and external ear normal. No decreased hearing noted.      Nose: Nose normal. No nasal deformity.      Mouth/Throat:      Mouth: Mucous membranes are moist.   Eyes:      General: Lids are normal.         Right eye: No discharge.         Left eye: No discharge.      Extraocular Movements: Extraocular movements intact.      Conjunctiva/sclera: Conjunctivae normal.      Pupils: Pupils are equal, round, and " reactive to light.   Neck:      Thyroid: No thyromegaly.      Vascular: No JVD.      Trachea: No tracheal deviation.   Cardiovascular:      Rate and Rhythm: Normal rate and regular rhythm.      Pulses: Normal pulses.      Heart sounds: Normal heart sounds. No murmur heard.    No friction rub. No gallop.   Pulmonary:      Effort: Pulmonary effort is normal. No respiratory distress.      Breath sounds: Normal breath sounds. No wheezing or rales.   Chest:      Chest wall: No tenderness.   Abdominal:      General: Bowel sounds are normal. There is no distension.      Palpations: Abdomen is soft.      Tenderness: There is abdominal tenderness. There is no guarding.      Comments: Very mild general epigastric tenderness, negative surgical abdomen    Musculoskeletal:         General: No tenderness or deformity. Normal range of motion.      Cervical back: Normal range of motion and neck supple.   Lymphadenopathy:      Cervical: No cervical adenopathy.   Skin:     General: Skin is warm and dry.      Findings: No erythema or rash.   Neurological:      Mental Status: She is alert and oriented to person, place, and time.      Cranial Nerves: No cranial nerve deficit.      Motor: No abnormal muscle tone.      Coordination: Coordination normal.      Deep Tendon Reflexes: Reflexes are normal and symmetric. Reflexes normal.   Psychiatric:         Mood and Affect: Mood normal.         Behavior: Behavior normal.         Thought Content: Thought content normal.         Judgment: Judgment normal.        Result Review :                Assessment and Plan   Diagnoses and all orders for this visit:    1. Health maintenance examination (Primary)    2. Elevated liver enzymes  -     Comprehensive Metabolic Panel; Future  -     CBC & Differential; Future  -     Lipid Panel; Future    3. Encounter for hepatitis C screening test for low risk patient  -     Hepatitis C antibody; Future    Here for annual exam, fasting labs discussed with pt,  immunizations up-to-date, colon cancer screening not due, GYN health UTD with total women, Mirena placed in 10/2020, cardiovascular screening neg for symptoms, counseled patient to increase vegetable intake, water and 150 minutes of exercise weekly combining weightbearing exercises and aerobic activity.    Goals to leave the house more, increase exercise, continue with eating healthful meals.  Discussed limiting carbs less than 150 g daily due to elevated LFTs, will recheck levels in 6 months       Follow Up   Return in about 6 months (around 1/14/2023), or if symptoms worsen or fail to improve, for Recheck elevated enzynes .  Patient was given instructions and counseling regarding her condition or for health maintenance advice. Please see specific information pulled into the AVS if appropriate. Medical assistant and I wore mask and eyewear protection during entire encounter.  Patient wore mask.    Marjorie Owens MD

## 2023-02-20 ENCOUNTER — OFFICE VISIT (OUTPATIENT)
Dept: FAMILY MEDICINE CLINIC | Facility: CLINIC | Age: 38
End: 2023-02-20
Payer: COMMERCIAL

## 2023-02-20 VITALS
DIASTOLIC BLOOD PRESSURE: 78 MMHG | OXYGEN SATURATION: 100 % | TEMPERATURE: 97.8 F | BODY MASS INDEX: 45.07 KG/M2 | HEART RATE: 92 BPM | WEIGHT: 264 LBS | HEIGHT: 64 IN | SYSTOLIC BLOOD PRESSURE: 112 MMHG

## 2023-02-20 DIAGNOSIS — R74.8 ELEVATED LIVER ENZYMES: ICD-10-CM

## 2023-02-20 DIAGNOSIS — M72.2 PLANTAR FASCIITIS OF LEFT FOOT: ICD-10-CM

## 2023-02-20 DIAGNOSIS — K80.20 CALCULUS OF GALLBLADDER WITHOUT CHOLECYSTITIS WITHOUT OBSTRUCTION: Primary | ICD-10-CM

## 2023-02-20 PROCEDURE — 99214 OFFICE O/P EST MOD 30 MIN: CPT | Performed by: FAMILY MEDICINE

## 2023-02-20 NOTE — PROGRESS NOTES
"ThankAnswers for HPI/ROS submitted by the patient on 2/13/2023  What is the primary reason for your visit?: Physical    Chief Complaint  Elevated Hepatic Enzymes (Follow up no complains ), Cholelithiasis (Had two episodes of gallbladder attacks    pain very sharp pain in center of chest area that last for 3 hours not more  tried pepto and tums and di not alleviated the pain), and Foot Pain (I think I developed plantar  fascitis I need advise hor to take care )    Subjective        Danuta Queen presents to [facility] PRIMARY [facility]ARE  History of Present Illness  Pleasant 37-year-old female here to follow-up for elevated liver enzymes improved, episodes of cholelithiasis, seen on MRI last year.  She has had a couple episodes of significant attacks.  One in Opctober, one in December, she was getting reeady for bed, feels like a knot in the center of her chest, almost starts like indigestion.  She gen gets a constant pain - does not matter how long it lasts, lasts 2-3 hours and then goes away, pain is aching and sharp. No nausea, she has had not symptoms since then, she can not reamember eating something fatty foods.     She also has foot pain - started over a year ago, gradually, fist step of the day and it is a severe pain on the L foot and as she walks it gets better.     Objective   Vital Signs:  /78   Pulse 92   Temp 97.8 °F (36.6 °C)   Ht 162.6 cm (64\")   Wt 120 kg (264 lb)   SpO2 100%   BMI 45.32 kg/m²   Estimated body mass index is 45.32 kg/m² as calculated from the following:    Height as of this encounter: 162.6 cm (64\").    Weight as of this encounter: 120 kg (264 lb).             Physical Exam  Vitals and nursing note reviewed.   Constitutional:       General: She is not in acute distress.     Appearance: She is well-developed.   HENT:      Head: Normocephalic.      Nose: Nose normal.   Cardiovascular:      Rate and Rhythm: Normal rate and regular rhythm.      Heart sounds: " Normal heart sounds. No murmur heard.  Pulmonary:      Effort: Pulmonary effort is normal. No respiratory distress.      Breath sounds: Normal breath sounds.   Musculoskeletal:         General: Normal range of motion.   Skin:     General: Skin is warm and dry.      Findings: No rash.   Neurological:      Mental Status: She is alert and oriented to person, place, and time.   Psychiatric:         Behavior: Behavior normal.         Thought Content: Thought content normal.         Judgment: Judgment normal.        Result Review :  The following data was reviewed by: Marjorie Owens MD on 02/20/2023:  CMP    CMP 7/7/22 2/15/23   Glucose 88 85   BUN 11 10   Creatinine 0.69 0.62   Sodium 141 141   Potassium 5.1 4.6   Chloride 105 104   Calcium 9.6 9.4   Total Protein 7.3 6.8   Albumin 4.4 4.4   Globulin 2.9 2.4   Total Bilirubin 0.6 0.4   Alkaline Phosphatase 110 103   AST (SGOT) 34 18   ALT (SGPT) 45 (A) 25   BUN/Creatinine Ratio 16 16.1   (A) Abnormal value            CBC    CBC 7/7/22 2/15/23   WBC 6.9 7.26   RBC 4.65 4.44   Hemoglobin 14.6 13.2   Hematocrit 44.4 41.5   MCV 96 93.5   MCH 31.4 29.7   MCHC 32.9 31.8   RDW 12.6 12.1 (A)   Platelets 319 335   (A) Abnormal value            Lipid Panel    Lipid Panel 7/7/22 2/15/23   Total Cholesterol 209 (A) 186   Triglycerides 123 124   HDL Cholesterol 57 66 (A)   VLDL Cholesterol 22 22   LDL Cholesterol  130 (A) 98   LDL/HDL Ratio 2.3    (A) Abnormal value       Comments are available for some flowsheets but are not being displayed.           TSH    TSH 7/7/22   TSH 2.280                        Assessment and Plan   Diagnoses and all orders for this visit:    1. Calculus of gallbladder without cholecystitis without obstruction (Primary)  -     Cancel: US Gallbladder; Future  -     US Abdomen Limited; Future    2. Elevated liver enzymes  -     Cancel: US Gallbladder; Future  -     US Abdomen Limited; Future    3. Plantar fasciitis of left foot    2 episodes of abdominal pain  in October and December, known cholelithiasis from MRI in 2021.  I do think reassessment with ultrasound is indicated especially as these episodes are more recent.  We could consider surgical referral depending on the findings.  Agree with continuing to minimize fatty foods.  She has been doing really well with eating healthier, this is reflected in her improved liver enzymes and cholesterol.  We discussed continuing with the good work she is already doing.  Possibly considering tracking macronutrients to help her with her goals of weight loss.    From a plantar fasciitis standpoint, goals are twofold, 1 to improved blood flow and the second is to reduce strain, tension along the fascial band.  Handout of exercises given as well as instructions to wear shoes with a wide toe base.  Right now I would recommend that she continue wearing shoes and not walking on her hardwood floors but in the future once her foot and arch has been rebuilt I think she would be able to go barefoot indoors again if she prefers.     I spent 34 minutes caring for Danuta on this date of service. This time includes time spent by me in the following activities:preparing for the visit, reviewing tests, counseling and educating the patient/family/caregiver, ordering medications, tests, or procedures, referring and communicating with other health care professionals  and documenting information in the medical record  Follow Up   Return in about 6 months (around 8/20/2023), or if symptoms worsen or fail to improve, for Medicare Wellness with fasting labs prior.  Patient was given instructions and counseling regarding her condition or for health maintenance advice. Please see specific information pulled into the AVS if appropriate. Medical assistant and I wore mask and eyewear protection during entire encounter.  Patient wore mask.    Marjorie Owens MD

## 2023-03-07 ENCOUNTER — HOSPITAL ENCOUNTER (OUTPATIENT)
Dept: ULTRASOUND IMAGING | Facility: HOSPITAL | Age: 38
Discharge: HOME | End: 2023-03-07
Admitting: FAMILY MEDICINE
Payer: COMMERCIAL

## 2023-03-07 DIAGNOSIS — R74.8 ELEVATED LIVER ENZYMES: ICD-10-CM

## 2023-03-07 DIAGNOSIS — K80.20 CALCULUS OF GALLBLADDER WITHOUT CHOLECYSTITIS WITHOUT OBSTRUCTION: ICD-10-CM

## 2023-03-07 PROCEDURE — 76705 ECHO EXAM OF ABDOMEN: CPT

## 2023-03-10 NOTE — PROGRESS NOTES
Please call patient back with results.  The ultrasound of the abdomen has resulted as positive for gallbladder stones, there is no inflammation of the gallbladder itself which is good.  There is also findings of fatty liver changes.  If you are still having symptoms I do think seeing a general surgeon to possibly remove the gallbladder would be helpful.  Please make a follow-up appoint with me to discuss treatments for fatty liver disease..  Please let us know if you have further questions.     Thank you

## 2023-03-26 ENCOUNTER — PATIENT MESSAGE (OUTPATIENT)
Dept: FAMILY MEDICINE CLINIC | Facility: CLINIC | Age: 38
End: 2023-03-26
Payer: COMMERCIAL

## 2023-03-26 DIAGNOSIS — K80.20 CALCULUS OF GALLBLADDER WITHOUT CHOLECYSTITIS WITHOUT OBSTRUCTION: Primary | ICD-10-CM

## 2023-03-26 DIAGNOSIS — K82.8 BILIARY DYSKINESIA: ICD-10-CM

## 2023-03-27 NOTE — TELEPHONE ENCOUNTER
From: Danuta Queen  To: Marjorie Shirley  Sent: 3/26/2023 5:23 AM EDT  Subject: Gallbladder    Hi Dr Shirley,  I know my ultrasound results showed no inflammation and basically said to continue monitoring my gallstone situation but I think it may be time to consider surgery.     I had another what I believe was a gallbladder attack Saturday night and it was the worst yet. The pain started in my mid-back and radiated to my upper abdomen. I felt nauseous but with no vomiting or fever, but the pain was a steady ache in the 6-7 level for over 5 hours. I took Tums and ibuprofen early on and it didn’t make a dent. After the worst of the pain subsided, the same area still felt tender at a 3 for the remainder of the night, which is new, and I couldn’t get any sleep. It was a miserable night and I don’t want to do it again.    Please let me know your thoughts. If you don’t advise surgery at this point, is there anything I can take or do during an attack to help alleviate some of the pain?     Thanks,  Danuta Queen

## 2023-04-20 ENCOUNTER — OFFICE VISIT (OUTPATIENT)
Dept: SURGERY | Facility: CLINIC | Age: 38
End: 2023-04-20
Payer: COMMERCIAL

## 2023-04-20 VITALS — BODY MASS INDEX: 45.24 KG/M2 | HEIGHT: 64 IN | WEIGHT: 265 LBS

## 2023-04-20 DIAGNOSIS — K80.10 CALCULUS OF GALLBLADDER WITH CHRONIC CHOLECYSTITIS WITHOUT OBSTRUCTION: Primary | ICD-10-CM

## 2023-04-20 PROCEDURE — 99204 OFFICE O/P NEW MOD 45 MIN: CPT | Performed by: SURGERY

## 2023-04-20 NOTE — PROGRESS NOTES
"General Surgery  Initial Office Visit    CC: Gallstones, abdominal pain    HPI: The patient is a pleasant 37 y.o. year-old lady who presents today for evaluation of gallstones and 2 separate episodes of \"gallbladder attacks\" over the last 6 months.  The gallstones were initially found back in 2021 when she had an MRI for kidney stones.  At that time, the gallstones were largely asymptomatic so they were left alone.  However, in the last 6 months she has had 2 separate attacks of back pain radiating to the right upper quadrant that come on suddenly in the evening and will last for a few hours on and before eventually subsiding.  The first episode happened last fall and lasted for about 2 to 3 hours with no associated nausea, vomiting, fever, chills, or change in her bowel habits.  The most recent episode lasted for over 5 hours and felt more severe with very intense back pain and indigestion.  She recently had an ultrasound of the gallbladder performed for work-up of her symptoms and this demonstrated multiple gallstones but no evidence of acute cholecystitis or biliary dilation.    Past Medical History:   None    Past Surgical History:   Left finger surgery  [place] tooth extraction    Medications:   Milk thistle once daily  Multivitamin once daily    Allergies: Tetracycline    Family History: Mother and father both had gallbladder disease requiring cholecystectomy    Social History: , works for [employer], non-smoker, social alcohol use    ROS:   Constitutional: Negative for fevers or chills  HENT: Negative for hearing loss or runny nose  Eyes: Negative for vision changes or scleral icterus  Respiratory: Negative for cough or shortness of breath  Cardiovascular: Negative for chest pain or heart palpitations  Gastrointestinal: Positive for recent abdominal pain; negative for abdominal distension, nausea, vomiting, constipation, melena, or hematochezia  Genitourinary: Negative for hematuria or " dysuria  Musculoskeletal: Positive for recent back pain; negative for joint swelling or gait instability  Neurologic: Negative for tremors or seizures  Psychiatric: Negative for suicidal ideations or agitation  All other systems reviewed and negative    Physical Exam:  Height: 162 cm  Weight: 20 kg  BMI: 45.49  General: No acute distress, well-nourished & well-developed  HEAD: normocephalic, atraumatic  EYES: normal conjunctiva, sclera anicteric  EARS: grossly normal hearing  NECK: supple, no thyromegaly  CARDIOVASCULAR: regular rate and rhythm  RESPIRATORY: clear to auscultation bilaterally  GASTROINTESTINAL: soft, nontender, non-distended  MUSCULOSKELETAL: normal gait and station. No gross extremity abnormalities  PSYCHIATRIC: oriented x3, normal mood and affect    IMAGING:  GALLBLADDER ULTRASOUND:  IMPRESSION:  1. Cholelithiasis, without sonographic evidence of acute cholecystitis   2. Hepatic steatosis with hepatic lesions, indeterminate. By report, the patient has previous MRI demonstrating hemangiomata. Correlate with that imaging.    MRI ABD:  IMPRESSION:   1. No hydronephrosis   2. Small bilateral renal cysts.   3. Cholelithiasis   4. Hepatic hemangioma       ASSESSMENT & PLAN  Mrs. Queen is a 37-year-old lady with newly diagnosed symptomatic cholelithiasis.  I reviewed her MRI of the abdomen and gallbladder ultrasound and would recommend we schedule her for laparoscopic cholecystectomy with cholangiogram.  I discussed the risks of the surgery which include bleeding, common bile duct injury, postoperative bile leak, and possible wound infection.  Despite these risks, she has consented to proceed.  I am happy to get scheduled for anytime after her upcoming vacation to [facility].    Gianna Drake MD  General, Robotic, and Endoscopic Surgery  [facility] ([facility]    [address] [place] [address], [address]  [place] [address]  P: [phone]  F: [phone]

## 2023-05-30 ENCOUNTER — PRE-ADMISSION TESTING (OUTPATIENT)
Dept: PREADMISSION TESTING | Facility: HOSPITAL | Age: 38
End: 2023-05-30

## 2023-05-30 VITALS
HEIGHT: 64 IN | WEIGHT: 262 LBS | RESPIRATION RATE: 20 BRPM | SYSTOLIC BLOOD PRESSURE: 149 MMHG | DIASTOLIC BLOOD PRESSURE: 84 MMHG | TEMPERATURE: 98.2 F | OXYGEN SATURATION: 97 % | HEART RATE: 104 BPM | BODY MASS INDEX: 44.73 KG/M2

## 2023-05-30 DIAGNOSIS — K80.10 CALCULUS OF GALLBLADDER WITH CHRONIC CHOLECYSTITIS WITHOUT OBSTRUCTION: ICD-10-CM

## 2023-05-30 LAB
ALBUMIN SERPL-MCNC: 4.7 G/DL (ref 3.5–5.2)
ALBUMIN/GLOB SERPL: 1.6 G/DL
ALP SERPL-CCNC: 121 U/L (ref 39–117)
ALT SERPL W P-5'-P-CCNC: 33 U/L (ref 1–33)
ANION GAP SERPL CALCULATED.3IONS-SCNC: 12.5 MMOL/L (ref 5–15)
AST SERPL-CCNC: 21 U/L (ref 1–32)
B-HCG UR QL: NEGATIVE
BASOPHILS # BLD AUTO: 0.05 10*3/MM3 (ref 0–0.2)
BASOPHILS NFR BLD AUTO: 0.7 % (ref 0–1.5)
BILIRUB SERPL-MCNC: 0.2 MG/DL (ref 0–1.2)
BUN SERPL-MCNC: 10 MG/DL (ref 6–20)
BUN/CREAT SERPL: 14.1 (ref 7–25)
CALCIUM SPEC-SCNC: 9.7 MG/DL (ref 8.6–10.5)
CHLORIDE SERPL-SCNC: 107 MMOL/L (ref 98–107)
CO2 SERPL-SCNC: 23.5 MMOL/L (ref 22–29)
CREAT SERPL-MCNC: 0.71 MG/DL (ref 0.57–1)
DEPRECATED RDW RBC AUTO: 42.5 FL (ref 37–54)
EGFRCR SERPLBLD CKD-EPI 2021: 111.1 ML/MIN/1.73
EOSINOPHIL # BLD AUTO: 0.18 10*3/MM3 (ref 0–0.4)
EOSINOPHIL NFR BLD AUTO: 2.4 % (ref 0.3–6.2)
ERYTHROCYTE [DISTWIDTH] IN BLOOD BY AUTOMATED COUNT: 13 % (ref 12.3–15.4)
GLOBULIN UR ELPH-MCNC: 2.9 GM/DL
GLUCOSE SERPL-MCNC: 95 MG/DL (ref 65–99)
HCT VFR BLD AUTO: 40.8 % (ref 34–46.6)
HGB BLD-MCNC: 13.4 G/DL (ref 12–15.9)
IMM GRANULOCYTES # BLD AUTO: 0.02 10*3/MM3 (ref 0–0.05)
IMM GRANULOCYTES NFR BLD AUTO: 0.3 % (ref 0–0.5)
LYMPHOCYTES # BLD AUTO: 1.88 10*3/MM3 (ref 0.7–3.1)
LYMPHOCYTES NFR BLD AUTO: 25.1 % (ref 19.6–45.3)
MCH RBC QN AUTO: 29.9 PG (ref 26.6–33)
MCHC RBC AUTO-ENTMCNC: 32.8 G/DL (ref 31.5–35.7)
MCV RBC AUTO: 91.1 FL (ref 79–97)
MONOCYTES # BLD AUTO: 0.34 10*3/MM3 (ref 0.1–0.9)
MONOCYTES NFR BLD AUTO: 4.5 % (ref 5–12)
NEUTROPHILS NFR BLD AUTO: 5.01 10*3/MM3 (ref 1.7–7)
NEUTROPHILS NFR BLD AUTO: 67 % (ref 42.7–76)
NRBC BLD AUTO-RTO: 0 /100 WBC (ref 0–0.2)
PLATELET # BLD AUTO: 394 10*3/MM3 (ref 140–450)
PMV BLD AUTO: 9.6 FL (ref 6–12)
POTASSIUM SERPL-SCNC: 4.5 MMOL/L (ref 3.5–5.2)
PROT SERPL-MCNC: 7.6 G/DL (ref 6–8.5)
RBC # BLD AUTO: 4.48 10*6/MM3 (ref 3.77–5.28)
SODIUM SERPL-SCNC: 143 MMOL/L (ref 136–145)
WBC NRBC COR # BLD: 7.48 10*3/MM3 (ref 3.4–10.8)

## 2023-05-30 PROCEDURE — 81025 URINE PREGNANCY TEST: CPT

## 2023-05-30 PROCEDURE — 85025 COMPLETE CBC W/AUTO DIFF WBC: CPT

## 2023-05-30 PROCEDURE — 80053 COMPREHEN METABOLIC PANEL: CPT

## 2023-05-30 PROCEDURE — 36415 COLL VENOUS BLD VENIPUNCTURE: CPT

## 2023-05-30 RX ORDER — IBUPROFEN 200 MG
400 TABLET ORAL EVERY 6 HOURS PRN
COMMUNITY

## 2023-05-30 RX ORDER — CHLORHEXIDINE GLUCONATE 500 MG/1
1 CLOTH TOPICAL
COMMUNITY

## 2023-05-30 NOTE — DISCHARGE INSTRUCTIONS

## 2023-06-13 ENCOUNTER — ANESTHESIA (OUTPATIENT)
Dept: PERIOP | Facility: HOSPITAL | Age: 38
End: 2023-06-13
Payer: COMMERCIAL

## 2023-06-13 ENCOUNTER — HOSPITAL ENCOUNTER (OUTPATIENT)
Facility: HOSPITAL | Age: 38
Setting detail: HOSPITAL OUTPATIENT SURGERY
Discharge: HOME | End: 2023-06-13
Attending: SURGERY | Admitting: SURGERY
Payer: COMMERCIAL

## 2023-06-13 ENCOUNTER — ANESTHESIA EVENT (OUTPATIENT)
Dept: PERIOP | Facility: HOSPITAL | Age: 38
End: 2023-06-13
Payer: COMMERCIAL

## 2023-06-13 ENCOUNTER — APPOINTMENT (OUTPATIENT)
Dept: GENERAL RADIOLOGY | Facility: HOSPITAL | Age: 38
End: 2023-06-13
Payer: COMMERCIAL

## 2023-06-13 VITALS
OXYGEN SATURATION: 95 % | SYSTOLIC BLOOD PRESSURE: 124 MMHG | TEMPERATURE: 97.9 F | DIASTOLIC BLOOD PRESSURE: 83 MMHG | RESPIRATION RATE: 16 BRPM | HEART RATE: 64 BPM

## 2023-06-13 DIAGNOSIS — K80.10 CALCULUS OF GALLBLADDER WITH CHRONIC CHOLECYSTITIS WITHOUT OBSTRUCTION: Primary | ICD-10-CM

## 2023-06-13 LAB
B-HCG UR QL: NEGATIVE
EXPIRATION DATE: NORMAL
INTERNAL NEGATIVE CONTROL: NEGATIVE
INTERNAL POSITIVE CONTROL: POSITIVE
Lab: NORMAL

## 2023-06-13 PROCEDURE — 25010000002 MAGNESIUM SULFATE PER 500 MG OF MAGNESIUM: Performed by: STUDENT IN AN ORGANIZED HEALTH CARE EDUCATION/TRAINING PROGRAM

## 2023-06-13 PROCEDURE — 25010000002 DEXAMETHASONE SODIUM PHOSPHATE 20 MG/5ML SOLUTION: Performed by: STUDENT IN AN ORGANIZED HEALTH CARE EDUCATION/TRAINING PROGRAM

## 2023-06-13 PROCEDURE — 25010000002 PROPOFOL 10 MG/ML EMULSION: Performed by: STUDENT IN AN ORGANIZED HEALTH CARE EDUCATION/TRAINING PROGRAM

## 2023-06-13 PROCEDURE — 25010000002 FENTANYL CITRATE (PF) 50 MCG/ML SOLUTION: Performed by: STUDENT IN AN ORGANIZED HEALTH CARE EDUCATION/TRAINING PROGRAM

## 2023-06-13 PROCEDURE — 74300 X-RAY BILE DUCTS/PANCREAS: CPT

## 2023-06-13 PROCEDURE — 25010000002 CEFAZOLIN PER 500 MG: Performed by: SURGERY

## 2023-06-13 PROCEDURE — 81025 URINE PREGNANCY TEST: CPT | Performed by: SURGERY

## 2023-06-13 PROCEDURE — 25010000002 ONDANSETRON PER 1 MG: Performed by: STUDENT IN AN ORGANIZED HEALTH CARE EDUCATION/TRAINING PROGRAM

## 2023-06-13 PROCEDURE — 88304 TISSUE EXAM BY PATHOLOGIST: CPT | Performed by: SURGERY

## 2023-06-13 PROCEDURE — 25010000002 HYDROMORPHONE PER 4 MG: Performed by: STUDENT IN AN ORGANIZED HEALTH CARE EDUCATION/TRAINING PROGRAM

## 2023-06-13 PROCEDURE — 25010000002 SUGAMMADEX 200 MG/2ML SOLUTION: Performed by: STUDENT IN AN ORGANIZED HEALTH CARE EDUCATION/TRAINING PROGRAM

## 2023-06-13 PROCEDURE — 25010000002 MIDAZOLAM PER 1 MG: Performed by: ANESTHESIOLOGY

## 2023-06-13 RX ORDER — HYDROMORPHONE HYDROCHLORIDE 1 MG/ML
0.5 INJECTION, SOLUTION INTRAMUSCULAR; INTRAVENOUS; SUBCUTANEOUS
Status: DISCONTINUED | OUTPATIENT
Start: 2023-06-13 | End: 2023-06-13 | Stop reason: HOSPADM

## 2023-06-13 RX ORDER — HYDROCODONE BITARTRATE AND ACETAMINOPHEN 7.5; 325 MG/1; MG/1
1 TABLET ORAL ONCE AS NEEDED
Status: DISCONTINUED | OUTPATIENT
Start: 2023-06-13 | End: 2023-06-13 | Stop reason: HOSPADM

## 2023-06-13 RX ORDER — ONDANSETRON 2 MG/ML
4 INJECTION INTRAMUSCULAR; INTRAVENOUS ONCE AS NEEDED
Status: DISCONTINUED | OUTPATIENT
Start: 2023-06-13 | End: 2023-06-13 | Stop reason: HOSPADM

## 2023-06-13 RX ORDER — ONDANSETRON 2 MG/ML
INJECTION INTRAMUSCULAR; INTRAVENOUS AS NEEDED
Status: DISCONTINUED | OUTPATIENT
Start: 2023-06-13 | End: 2023-06-13 | Stop reason: SURG

## 2023-06-13 RX ORDER — DIPHENHYDRAMINE HYDROCHLORIDE 50 MG/ML
12.5 INJECTION INTRAMUSCULAR; INTRAVENOUS
Status: DISCONTINUED | OUTPATIENT
Start: 2023-06-13 | End: 2023-06-13 | Stop reason: HOSPADM

## 2023-06-13 RX ORDER — PROMETHAZINE HYDROCHLORIDE 25 MG/1
25 SUPPOSITORY RECTAL ONCE AS NEEDED
Status: DISCONTINUED | OUTPATIENT
Start: 2023-06-13 | End: 2023-06-13 | Stop reason: HOSPADM

## 2023-06-13 RX ORDER — IPRATROPIUM BROMIDE AND ALBUTEROL SULFATE 2.5; .5 MG/3ML; MG/3ML
3 SOLUTION RESPIRATORY (INHALATION) ONCE AS NEEDED
Status: DISCONTINUED | OUTPATIENT
Start: 2023-06-13 | End: 2023-06-13 | Stop reason: HOSPADM

## 2023-06-13 RX ORDER — SODIUM CHLORIDE, SODIUM LACTATE, POTASSIUM CHLORIDE, CALCIUM CHLORIDE 600; 310; 30; 20 MG/100ML; MG/100ML; MG/100ML; MG/100ML
9 INJECTION, SOLUTION INTRAVENOUS CONTINUOUS
Status: DISCONTINUED | OUTPATIENT
Start: 2023-06-13 | End: 2023-06-13 | Stop reason: HOSPADM

## 2023-06-13 RX ORDER — ROCURONIUM BROMIDE 10 MG/ML
INJECTION, SOLUTION INTRAVENOUS AS NEEDED
Status: DISCONTINUED | OUTPATIENT
Start: 2023-06-13 | End: 2023-06-13 | Stop reason: SURG

## 2023-06-13 RX ORDER — SCOLOPAMINE TRANSDERMAL SYSTEM 1 MG/1
1 PATCH, EXTENDED RELEASE TRANSDERMAL ONCE
Status: DISCONTINUED | OUTPATIENT
Start: 2023-06-13 | End: 2023-06-13 | Stop reason: HOSPADM

## 2023-06-13 RX ORDER — FLUMAZENIL 0.1 MG/ML
0.2 INJECTION INTRAVENOUS AS NEEDED
Status: DISCONTINUED | OUTPATIENT
Start: 2023-06-13 | End: 2023-06-13 | Stop reason: HOSPADM

## 2023-06-13 RX ORDER — FENTANYL CITRATE 50 UG/ML
50 INJECTION, SOLUTION INTRAMUSCULAR; INTRAVENOUS ONCE AS NEEDED
Status: DISCONTINUED | OUTPATIENT
Start: 2023-06-13 | End: 2023-06-13 | Stop reason: HOSPADM

## 2023-06-13 RX ORDER — DROPERIDOL 2.5 MG/ML
0.62 INJECTION, SOLUTION INTRAMUSCULAR; INTRAVENOUS
Status: DISCONTINUED | OUTPATIENT
Start: 2023-06-13 | End: 2023-06-13 | Stop reason: HOSPADM

## 2023-06-13 RX ORDER — PROMETHAZINE HYDROCHLORIDE 25 MG/1
25 TABLET ORAL ONCE AS NEEDED
Status: DISCONTINUED | OUTPATIENT
Start: 2023-06-13 | End: 2023-06-13 | Stop reason: HOSPADM

## 2023-06-13 RX ORDER — SODIUM CHLORIDE 0.9 % (FLUSH) 0.9 %
3-10 SYRINGE (ML) INJECTION AS NEEDED
Status: DISCONTINUED | OUTPATIENT
Start: 2023-06-13 | End: 2023-06-13 | Stop reason: HOSPADM

## 2023-06-13 RX ORDER — FENTANYL CITRATE 50 UG/ML
INJECTION, SOLUTION INTRAMUSCULAR; INTRAVENOUS AS NEEDED
Status: DISCONTINUED | OUTPATIENT
Start: 2023-06-13 | End: 2023-06-13 | Stop reason: SURG

## 2023-06-13 RX ORDER — PROPOFOL 10 MG/ML
VIAL (ML) INTRAVENOUS AS NEEDED
Status: DISCONTINUED | OUTPATIENT
Start: 2023-06-13 | End: 2023-06-13 | Stop reason: SURG

## 2023-06-13 RX ORDER — CEFAZOLIN SODIUM IN 0.9 % NACL 3 G/100 ML
3 INTRAVENOUS SOLUTION, PIGGYBACK (ML) INTRAVENOUS ONCE
Status: COMPLETED | OUTPATIENT
Start: 2023-06-13 | End: 2023-06-13

## 2023-06-13 RX ORDER — SODIUM CHLORIDE 0.9 % (FLUSH) 0.9 %
3 SYRINGE (ML) INJECTION EVERY 12 HOURS SCHEDULED
Status: DISCONTINUED | OUTPATIENT
Start: 2023-06-13 | End: 2023-06-13 | Stop reason: HOSPADM

## 2023-06-13 RX ORDER — HYDRALAZINE HYDROCHLORIDE 20 MG/ML
5 INJECTION INTRAMUSCULAR; INTRAVENOUS
Status: DISCONTINUED | OUTPATIENT
Start: 2023-06-13 | End: 2023-06-13 | Stop reason: HOSPADM

## 2023-06-13 RX ORDER — FENTANYL CITRATE 50 UG/ML
50 INJECTION, SOLUTION INTRAMUSCULAR; INTRAVENOUS
Status: DISCONTINUED | OUTPATIENT
Start: 2023-06-13 | End: 2023-06-13 | Stop reason: HOSPADM

## 2023-06-13 RX ORDER — DEXAMETHASONE SODIUM PHOSPHATE 4 MG/ML
INJECTION, SOLUTION INTRA-ARTICULAR; INTRALESIONAL; INTRAMUSCULAR; INTRAVENOUS; SOFT TISSUE AS NEEDED
Status: DISCONTINUED | OUTPATIENT
Start: 2023-06-13 | End: 2023-06-13 | Stop reason: SURG

## 2023-06-13 RX ORDER — MIDAZOLAM HYDROCHLORIDE 1 MG/ML
1 INJECTION INTRAMUSCULAR; INTRAVENOUS
Status: DISCONTINUED | OUTPATIENT
Start: 2023-06-13 | End: 2023-06-13 | Stop reason: HOSPADM

## 2023-06-13 RX ORDER — LABETALOL HYDROCHLORIDE 5 MG/ML
5 INJECTION, SOLUTION INTRAVENOUS
Status: DISCONTINUED | OUTPATIENT
Start: 2023-06-13 | End: 2023-06-13 | Stop reason: HOSPADM

## 2023-06-13 RX ORDER — NALOXONE HCL 0.4 MG/ML
0.2 VIAL (ML) INJECTION AS NEEDED
Status: DISCONTINUED | OUTPATIENT
Start: 2023-06-13 | End: 2023-06-13 | Stop reason: HOSPADM

## 2023-06-13 RX ORDER — FAMOTIDINE 10 MG/ML
20 INJECTION, SOLUTION INTRAVENOUS ONCE
Status: COMPLETED | OUTPATIENT
Start: 2023-06-13 | End: 2023-06-13

## 2023-06-13 RX ORDER — GLYCOPYRROLATE 0.2 MG/ML
INJECTION INTRAMUSCULAR; INTRAVENOUS AS NEEDED
Status: DISCONTINUED | OUTPATIENT
Start: 2023-06-13 | End: 2023-06-13 | Stop reason: SURG

## 2023-06-13 RX ORDER — LIDOCAINE HYDROCHLORIDE 10 MG/ML
0.5 INJECTION, SOLUTION EPIDURAL; INFILTRATION; INTRACAUDAL; PERINEURAL ONCE AS NEEDED
Status: DISCONTINUED | OUTPATIENT
Start: 2023-06-13 | End: 2023-06-13 | Stop reason: HOSPADM

## 2023-06-13 RX ORDER — LIDOCAINE HYDROCHLORIDE 20 MG/ML
INJECTION, SOLUTION INFILTRATION; PERINEURAL AS NEEDED
Status: DISCONTINUED | OUTPATIENT
Start: 2023-06-13 | End: 2023-06-13 | Stop reason: SURG

## 2023-06-13 RX ORDER — SODIUM CHLORIDE 9 MG/ML
INJECTION, SOLUTION INTRAVENOUS AS NEEDED
Status: DISCONTINUED | OUTPATIENT
Start: 2023-06-13 | End: 2023-06-13 | Stop reason: HOSPADM

## 2023-06-13 RX ORDER — OXYCODONE HYDROCHLORIDE AND ACETAMINOPHEN 5; 325 MG/1; MG/1
1 TABLET ORAL EVERY 4 HOURS PRN
Qty: 12 TABLET | Refills: 0 | Status: SHIPPED | OUTPATIENT
Start: 2023-06-13

## 2023-06-13 RX ORDER — OXYCODONE AND ACETAMINOPHEN 7.5; 325 MG/1; MG/1
1 TABLET ORAL EVERY 4 HOURS PRN
Status: DISCONTINUED | OUTPATIENT
Start: 2023-06-13 | End: 2023-06-13 | Stop reason: HOSPADM

## 2023-06-13 RX ORDER — BUPIVACAINE HYDROCHLORIDE AND EPINEPHRINE 5; 5 MG/ML; UG/ML
INJECTION, SOLUTION EPIDURAL; INTRACAUDAL; PERINEURAL AS NEEDED
Status: DISCONTINUED | OUTPATIENT
Start: 2023-06-13 | End: 2023-06-13 | Stop reason: HOSPADM

## 2023-06-13 RX ORDER — MAGNESIUM SULFATE HEPTAHYDRATE 500 MG/ML
INJECTION, SOLUTION INTRAMUSCULAR; INTRAVENOUS AS NEEDED
Status: DISCONTINUED | OUTPATIENT
Start: 2023-06-13 | End: 2023-06-13 | Stop reason: SURG

## 2023-06-13 RX ORDER — EPHEDRINE SULFATE 50 MG/ML
5 INJECTION, SOLUTION INTRAVENOUS ONCE AS NEEDED
Status: DISCONTINUED | OUTPATIENT
Start: 2023-06-13 | End: 2023-06-13 | Stop reason: HOSPADM

## 2023-06-13 RX ADMIN — SCOPALAMINE 1 PATCH: 1 PATCH, EXTENDED RELEASE TRANSDERMAL at 11:59

## 2023-06-13 RX ADMIN — FENTANYL CITRATE 25 MCG: 50 INJECTION, SOLUTION INTRAMUSCULAR; INTRAVENOUS at 13:47

## 2023-06-13 RX ADMIN — ROCURONIUM BROMIDE 50 MG: 10 INJECTION, SOLUTION INTRAVENOUS at 13:38

## 2023-06-13 RX ADMIN — PROPOFOL 25 MCG/KG/MIN: 10 INJECTION, EMULSION INTRAVENOUS at 13:52

## 2023-06-13 RX ADMIN — LIDOCAINE HYDROCHLORIDE 60 MG: 20 INJECTION, SOLUTION INFILTRATION; PERINEURAL at 14:24

## 2023-06-13 RX ADMIN — CEFAZOLIN 3 G: 10 INJECTION, POWDER, FOR SOLUTION INTRAVENOUS at 13:28

## 2023-06-13 RX ADMIN — LIDOCAINE HYDROCHLORIDE 100 MG: 20 INJECTION, SOLUTION INFILTRATION; PERINEURAL at 13:37

## 2023-06-13 RX ADMIN — MAGNESIUM SULFATE HEPTAHYDRATE 1 G: 500 INJECTION, SOLUTION INTRAMUSCULAR; INTRAVENOUS at 13:52

## 2023-06-13 RX ADMIN — SODIUM CHLORIDE, POTASSIUM CHLORIDE, SODIUM LACTATE AND CALCIUM CHLORIDE 9 ML/HR: 600; 310; 30; 20 INJECTION, SOLUTION INTRAVENOUS at 12:00

## 2023-06-13 RX ADMIN — DEXAMETHASONE SODIUM PHOSPHATE 8 MG: 4 INJECTION, SOLUTION INTRAMUSCULAR; INTRAVENOUS at 13:44

## 2023-06-13 RX ADMIN — HYDROMORPHONE HYDROCHLORIDE 0.5 MG: 1 INJECTION, SOLUTION INTRAMUSCULAR; INTRAVENOUS; SUBCUTANEOUS at 15:01

## 2023-06-13 RX ADMIN — PROPOFOL 200 MG: 10 INJECTION, EMULSION INTRAVENOUS at 13:37

## 2023-06-13 RX ADMIN — GLYCOPYRROLATE 0.1 MCG: 1 INJECTION INTRAMUSCULAR; INTRAVENOUS at 13:52

## 2023-06-13 RX ADMIN — MIDAZOLAM 1 MG: 1 INJECTION INTRAMUSCULAR; INTRAVENOUS at 11:59

## 2023-06-13 RX ADMIN — FAMOTIDINE 20 MG: 10 INJECTION INTRAVENOUS at 11:59

## 2023-06-13 RX ADMIN — FENTANYL CITRATE 50 MCG: 50 INJECTION, SOLUTION INTRAMUSCULAR; INTRAVENOUS at 14:30

## 2023-06-13 RX ADMIN — ONDANSETRON 4 MG: 2 INJECTION INTRAMUSCULAR; INTRAVENOUS at 14:24

## 2023-06-13 RX ADMIN — FENTANYL CITRATE 25 MCG: 50 INJECTION, SOLUTION INTRAMUSCULAR; INTRAVENOUS at 13:45

## 2023-06-13 RX ADMIN — OXYCODONE HYDROCHLORIDE AND ACETAMINOPHEN 1 TABLET: 7.5; 325 TABLET ORAL at 15:35

## 2023-06-13 RX ADMIN — FENTANYL CITRATE 50 MCG: 50 INJECTION, SOLUTION INTRAMUSCULAR; INTRAVENOUS at 15:15

## 2023-06-13 RX ADMIN — SUGAMMADEX 200 MG: 100 INJECTION, SOLUTION INTRAVENOUS at 14:28

## 2023-06-13 NOTE — ANESTHESIA PREPROCEDURE EVALUATION
Anesthesia Evaluation     NPO Solid Status: > 8 hours  NPO Liquid Status: > 2 hours           Airway   Mallampati: II  TM distance: >3 FB  Neck ROM: full  Large neck circumference  Dental - normal exam     Pulmonary - negative pulmonary ROS   Cardiovascular   Exercise tolerance: good (4-7 METS)    Rhythm: regular  Rate: normal    (-) hypertension, angina      Neuro/Psych- negative ROS  GI/Hepatic/Renal/Endo    (+) morbid obesity  (-) no renal disease, diabetes, no thyroid disorder    Musculoskeletal     Abdominal    Substance History      OB/GYN          Other                        Anesthesia Plan    ASA 2     general     intravenous induction     Anesthetic plan, risks, benefits, and alternatives have been provided, discussed and informed consent has been obtained with: patient.      CODE STATUS:

## 2023-06-13 NOTE — H&P
"General Surgery  History and Physical    CC: Gallstones, abdominal pain     HPI: The patient is a pleasant 37 year-old lady who presents today for elective cholecystectomy.  I saw her in the office late April for evaluation of gallstones and 2 separate episodes of \"gallbladder attacks\" over the last 6 months.  The gallstones were initially found back in 2021 when she had an MRI for kidney stones.  At that time, the gallstones were largely asymptomatic so they were left alone.  However, in the last 6 months she has had 2 separate attacks of back pain radiating to the right upper quadrant that come on suddenly in the evening and will last for a few hours on and before eventually subsiding.  The first episode happened last fall and lasted for about 2 to 3 hours with no associated nausea, vomiting, fever, chills, or change in her bowel habits.  The most recent episode lasted for over 5 hours and felt more severe with very intense back pain and indigestion.  She recently had an ultrasound of the gallbladder performed for work-up of her symptoms and this demonstrated multiple gallstones but no evidence of acute cholecystitis or biliary dilation.    Past Medical History:   None     Past Surgical History:   Left finger surgery  [place] tooth extraction    Medications:   Medications Prior to Admission   Medication Sig Dispense Refill Last Dose    Chlorhexidine Gluconate Cloth 2 % pads Apply 1 application topically. USE AS DIRECTED PREOP   6/13/2023 at 1000    ibuprofen (ADVIL,MOTRIN) 200 MG tablet Take 2 tablets by mouth Every 6 (Six) Hours As Needed for Mild Pain.   6/9/2023    Levonorgestrel (MIRENA) 20 MCG/DAY intrauterine device IUD 1 each by Intrauterine route Every 7 (Seven) Years. Placed 10/2020, 2nd one       MILK THISTLE PO Take 1 capsule by mouth Daily.   6/6/2023    Multiple Vitamins-Minerals (MULTIVITAMIN WITH MINERALS) tablet tablet Take 1 tablet by mouth Daily.   6/6/2023       Allergies: Tetracycline     Family " History: Mother and father both had gallbladder disease requiring cholecystectomy     Social History: , works for [employer], non-smoker, social alcohol use    ROS: A comprehensive review of systems was conducted and significant only for the following positives: Abdominal pain  All other systems reviewed and negative    Physical Exam:  Vitals:    06/13/23 1135   BP: 138/87   Pulse: 86   Resp: 16   Temp: 98.6 °F (37 °C)   SpO2: 96%     Height: 162 cm  Weight: 119 kg  BMI: 44.9  General: No acute distress, well-nourished & well-developed  HEAD: normocephalic, atraumatic  EYES: normal conjunctiva, sclera anicteric  EARS: grossly normal hearing  NECK: supple, no thyromegaly  CARDIOVASCULAR: regular rate and rhythm  RESPIRATORY: clear to auscultation bilaterally  GASTROINTESTINAL: soft, nontender, non-distended  PSYCHIATRIC: oriented x3, normal mood and affect    IMAGING:  GALLBLADDER ULTRASOUND:  IMPRESSION:  1. Cholelithiasis, without sonographic evidence of acute cholecystitis   2. Hepatic steatosis with hepatic lesions, indeterminate. By report, the patient has previous MRI demonstrating hemangiomata. Correlate with that imaging.     MRI ABD:  IMPRESSION:   1. No hydronephrosis   2. Small bilateral renal cysts.   3. Cholelithiasis   4. Hepatic hemangioma    ASSESSMENT & PLAN  Mrs. Queen is a 37-year-old lady with symptomatic cholelithiasis.  I recommended proceeding with laparoscopic cholecystectomy and cholangiogram today.  She understands the risks of the procedure to include bleeding, common bile duct injury, and postoperative bile leak.  Despite these risks, she has consented to proceed.    Copied text in this note has been reviewed by me and remains accurate as of 06/13/23    Gianna Drake MD  General, Robotic, and Endoscopic Surgery  [facility]    [address] [place] [address], [address]  [place] [address]  P: [phone]  F: [phone]

## 2023-06-13 NOTE — ANESTHESIA PROCEDURE NOTES
Airway  Urgency: elective    Date/Time: 6/13/2023 1:41 PM  Airway not difficult    General Information and Staff    Patient location during procedure: OR  Anesthesiologist: Ashley Mercado MD  CRNA/CAA: Sonny Menendez CRNA    Indications and Patient Condition  Indications for airway management: airway protection    Preoxygenated: yes  MILS not maintained throughout  Mask difficulty assessment: 1 - vent by mask    Final Airway Details  Final airway type: endotracheal airway      Successful airway: ETT  Cuffed: yes   Successful intubation technique: direct laryngoscopy  Endotracheal tube insertion site: oral  Blade: Sadia  Blade size: 3  ETT size (mm): 7.5  Cormack-Lehane Classification: grade IIa - partial view of glottis  Placement verified by: capnometry   Cuff volume (mL): 6  Measured from: lips  ETT/EBT  to lips (cm): 21  Number of attempts at approach: 1  Assessment: lips, teeth, and gum same as pre-op and atraumatic intubation

## 2023-06-13 NOTE — OP NOTE
Operative Note :  Gianna Drake MD    Danuta Queen  1985    Procedure Date: 06/13/23    Pre-op Diagnosis:  Calculus of gallbladder with chronic cholecystitis without obstruction [K80.10]    Post-op Diagnosis:  Calculus of gallbladder with chronic cholecystitis without obstruction [K80.10]    Procedure:   Laparoscopic cholecystectomy with cholangiogram    Surgeon: Gianna Drake MD    Assistant: Gianna Tejada PA-C (Gianna was responsible for laparoscopic manipulation of the gallbladder during critical portions of the dissection, handling of the laparoscopic camera, closure of all surgical incisions, and application of topical sterile dressings)    Anesthesia:  General (general endotracheal tube)    Estimated Blood Loss: minimal    Specimens:  Gallbladder    Complications: None    Indications: The patient is a 37-year lady who came to see me towards the end of April with symptomatic cholelithiasis.  I have recommended proceeding to the operating today for laparoscopic cholecystectomy with cholangiogram.  She understands the risks of the procedure include bleeding, common bile duct injury, postoperative bile leak, and possible wound infection.  Despite these risks, she has consented to proceed.    Findings: Normal cholangiogram    Description of procedure:  The patient was brought to the operating room and placed on the OR table in supine position.  An endotracheal tube was inserted, and general anesthesia was induced.  The anterior abdominal wall was shaved, prepped, and draped in a sterile fashion.  A surgical timeout was then completed.  A curvilinear infraumbilical skin incision was made after instillation of 0.5% Marcaine with epinephrine.  The umbilical stalk was grasped and elevated, and a longitudinal fasciotomy made.  A Pisano trocar was inserted and secured with 2 separate 0 Vicryl stay sutures.  The abdomen was then insufflated.  Under direct visualization, 3 additional 5 mm trocars  were then placed within the subxiphoid and subcostal space on the right.  The gallbladder was retracted cephalad and infundibulum retracted inferiorly. The cystic duct and cystic artery were slowly dissected out circumferentially until a firm critical view was obtained.  A single Hemoclip was placed across the cystic duct at its junction with the gallbladder infundibulum and 3 hemoclips were placed across the cystic artery.  A small hole was created on the anterior wall of the cystic duct, and a cholangiogram catheter inserted through a right upper quadrant angiocatheter.  The catheter was secured within the duct with an additional Hemoclip and a cholangiogram was then obtained.  The cholangiogram showed filling of the cystic duct and common bile duct, retrograde filling of the intrahepatic ducts, and easy spillage into the duodenum with no filling defects appreciable.  The catheter was then withdrawn and 2 additional hemoclips placed across the cystic duct.  The cystic duct and artery were then transected, leaving 2 clips behind on both of the stumps.  The gallbladder was then slowly dissected off of the undersurface of the liver using electrocautery and it was removed from the peritoneal cavity within an Endo Catch bag at the umbilical trocar site.  The gallbladder was passed off to pathology.  The abdomen was then reinsufflated and right upper quadrant inspected. Warm normal saline was irrigated and suctioned dry.  The gallbladder fossa appeared hemostatic.  All trocars were then removed under direct visualization and the abdomen desufflated.  The umbilical fascial incision was closed using a new 0 Vicryl figure-of-eight suture, all skin incisions closed with 4-0 Vicryl subcuticular stitches, and then each was dressed with Exofin glue.  The patient was then extubated and transferred to PACU in stable condition.  All counts were correct per nursing.    Gianna Drake MD  General, Robotic, and Endoscopic  Surgery  [facility]    [address], [address]  [place] [id number]  P: [phone]  F: [phone]

## 2023-06-14 LAB
LAB AP CASE REPORT: NORMAL
PATH REPORT.FINAL DX SPEC: NORMAL
PATH REPORT.GROSS SPEC: NORMAL

## 2023-06-14 NOTE — ANESTHESIA POSTPROCEDURE EVALUATION
Patient: Danuta Queen    Procedure Summary       Date: 06/13/23 Room / Location: [facility] OR  / [facility] MAIN OR    Anesthesia Start: 1331 Anesthesia Stop: 1440    Procedure: LAPAROSCOPIC CHOLECYSTECTOMY WITH INTRAOPERATIVE CHOLANGIOGRAM (Abdomen) Diagnosis:       Calculus of gallbladder with chronic cholecystitis without obstruction      (Calculus of gallbladder with chronic cholecystitis without obstruction [K80.10])    Surgeons: Gianna Drake MD Provider: Ashley Mercado MD    Anesthesia Type: general ASA Status: 2            Anesthesia Type: general    Vitals  Vitals Value Taken Time   /79 06/13/23 1615   Temp 36.6 °C (97.9 °F) 06/13/23 1438   Pulse 81 06/13/23 1621   Resp 16 06/13/23 1545   SpO2 96 % 06/13/23 1621   Vitals shown include unvalidated device data.        Post Anesthesia Care and Evaluation    Patient location during evaluation: bedside  Pain management: adequate    Airway patency: patent  Anesthetic complications: No anesthetic complications    Cardiovascular status: acceptable  Respiratory status: acceptable  Hydration status: acceptable

## 2023-09-05 DIAGNOSIS — Z13.1 SCREENING FOR DIABETES MELLITUS: ICD-10-CM

## 2023-09-05 DIAGNOSIS — R74.8 ELEVATED LIVER ENZYMES: Primary | ICD-10-CM

## 2023-09-05 DIAGNOSIS — Z13.0 SCREENING, ANEMIA, DEFICIENCY, IRON: ICD-10-CM

## 2023-09-05 DIAGNOSIS — Z13.220 SCREENING FOR LIPID DISORDERS: ICD-10-CM

## 2023-09-05 DIAGNOSIS — Z00.00 HEALTH MAINTENANCE EXAMINATION: ICD-10-CM

## 2023-09-06 LAB
ALBUMIN SERPL-MCNC: 4.4 G/DL (ref 3.5–5.2)
ALBUMIN/GLOB SERPL: 1.7 G/DL
ALP SERPL-CCNC: 126 U/L (ref 39–117)
ALT SERPL-CCNC: 32 U/L (ref 1–33)
AST SERPL-CCNC: 22 U/L (ref 1–32)
BASOPHILS # BLD AUTO: 0.06 10*3/MM3 (ref 0–0.2)
BASOPHILS NFR BLD AUTO: 0.9 % (ref 0–1.5)
BILIRUB SERPL-MCNC: 0.5 MG/DL (ref 0–1.2)
BUN SERPL-MCNC: 9 MG/DL (ref 6–20)
BUN/CREAT SERPL: 13.8 (ref 7–25)
CALCIUM SERPL-MCNC: 9.7 MG/DL (ref 8.6–10.5)
CHLORIDE SERPL-SCNC: 104 MMOL/L (ref 98–107)
CHOLEST SERPL-MCNC: 178 MG/DL (ref 0–200)
CO2 SERPL-SCNC: 24.9 MMOL/L (ref 22–29)
CREAT SERPL-MCNC: 0.65 MG/DL (ref 0.57–1)
EGFRCR SERPLBLD CKD-EPI 2021: 114.3 ML/MIN/1.73
EOSINOPHIL # BLD AUTO: 0.15 10*3/MM3 (ref 0–0.4)
EOSINOPHIL NFR BLD AUTO: 2.3 % (ref 0.3–6.2)
ERYTHROCYTE [DISTWIDTH] IN BLOOD BY AUTOMATED COUNT: 13.1 % (ref 12.3–15.4)
GLOBULIN SER CALC-MCNC: 2.6 GM/DL
GLUCOSE SERPL-MCNC: 90 MG/DL (ref 65–99)
HCT VFR BLD AUTO: 44 % (ref 34–46.6)
HDLC SERPL-MCNC: 56 MG/DL (ref 40–60)
HGB BLD-MCNC: 14.6 G/DL (ref 12–15.9)
IMM GRANULOCYTES # BLD AUTO: 0.02 10*3/MM3 (ref 0–0.05)
IMM GRANULOCYTES NFR BLD AUTO: 0.3 % (ref 0–0.5)
LDLC SERPL CALC-MCNC: 102 MG/DL (ref 0–100)
LDLC/HDLC SERPL: 1.78 {RATIO}
LYMPHOCYTES # BLD AUTO: 1.63 10*3/MM3 (ref 0.7–3.1)
LYMPHOCYTES NFR BLD AUTO: 24.5 % (ref 19.6–45.3)
MCH RBC QN AUTO: 30.3 PG (ref 26.6–33)
MCHC RBC AUTO-ENTMCNC: 33.2 G/DL (ref 31.5–35.7)
MCV RBC AUTO: 91.3 FL (ref 79–97)
MONOCYTES # BLD AUTO: 0.33 10*3/MM3 (ref 0.1–0.9)
MONOCYTES NFR BLD AUTO: 5 % (ref 5–12)
NEUTROPHILS # BLD AUTO: 4.45 10*3/MM3 (ref 1.7–7)
NEUTROPHILS NFR BLD AUTO: 67 % (ref 42.7–76)
NRBC BLD AUTO-RTO: 0 /100 WBC (ref 0–0.2)
PLATELET # BLD AUTO: 330 10*3/MM3 (ref 140–450)
POTASSIUM SERPL-SCNC: 4.5 MMOL/L (ref 3.5–5.2)
PROT SERPL-MCNC: 7 G/DL (ref 6–8.5)
RBC # BLD AUTO: 4.82 10*6/MM3 (ref 3.77–5.28)
SODIUM SERPL-SCNC: 141 MMOL/L (ref 136–145)
TRIGL SERPL-MCNC: 113 MG/DL (ref 0–150)
VLDLC SERPL CALC-MCNC: 20 MG/DL (ref 5–40)
WBC # BLD AUTO: 6.64 10*3/MM3 (ref 3.4–10.8)

## 2023-09-12 ENCOUNTER — OFFICE VISIT (OUTPATIENT)
Dept: FAMILY MEDICINE CLINIC | Facility: CLINIC | Age: 38
End: 2023-09-12
Payer: COMMERCIAL

## 2023-09-12 VITALS
BODY MASS INDEX: 43.54 KG/M2 | SYSTOLIC BLOOD PRESSURE: 122 MMHG | HEIGHT: 64 IN | DIASTOLIC BLOOD PRESSURE: 80 MMHG | OXYGEN SATURATION: 98 % | WEIGHT: 255 LBS | TEMPERATURE: 97.6 F | HEART RATE: 90 BPM

## 2023-09-12 DIAGNOSIS — L23.0 ALLERGIC CONTACT DERMATITIS DUE TO METALS: ICD-10-CM

## 2023-09-12 DIAGNOSIS — Z00.00 HEALTH MAINTENANCE EXAMINATION: Primary | ICD-10-CM

## 2023-09-12 PROCEDURE — 99396 PREV VISIT EST AGE 40-64: CPT | Performed by: FAMILY MEDICINE

## 2023-09-12 RX ORDER — TRIAMCINOLONE ACETONIDE 5 MG/G
1 OINTMENT TOPICAL 2 TIMES DAILY
Qty: 30 G | Refills: 2 | Status: SHIPPED | OUTPATIENT
Start: 2023-09-12

## 2023-09-12 NOTE — PROGRESS NOTES
"Chief Complaint  Annual Exam (No  complains  had gallbladder removed and  now has gi has changes more dIARRHEA /WANT TO MENTION HEART RATE IS IN THE 80 AND WANT TO MAKE SURE this IS OK , AND HAS SKIN REDNESS WHERE  THE WATCH  USED TO BE in left wrist  it wont go away )    Subjective        Danuta Queen presents to [facility]  History of Present Illness  Annual Exam.    Health Maintenance   Topic Date Due    BMI FOLLOWUP  Never done    TDAP/TD VACCINES (2 - Td or Tdap) 05/13/2023    ANNUAL PHYSICAL  07/14/2023    INFLUENZA VACCINE  10/01/2023    PAP SMEAR  12/02/2025    HEPATITIS C SCREENING  Completed    COVID-19 Vaccine  Completed    Pneumococcal Vaccine 0-64  Aged Out       Diet: Eating well over all, feels that her exercise is good,  Exercise: working on doing her rowing matching 30 min a day  GYN: FRANCI castillo GYN no problems - Mirena   Immunizations: discussed   Colon cancer screening: Due at 43  Sleep/mental health: Doing great no concerns  Dentist: Up-to-date    Objective   Vital Signs:  /80   Pulse 90   Temp 97.6 °F (36.4 °C)   Ht 162.6 cm (64\")   Wt 116 kg (255 lb)   SpO2 98%   BMI 43.77 kg/m²   Estimated body mass index is 43.77 kg/m² as calculated from the following:    Height as of this encounter: 162.6 cm (64\").    Weight as of this encounter: 116 kg (255 lb).       Class 3 Severe Obesity (BMI >=40). Obesity-related health conditions include the following: GERD. Obesity is improving with lifestyle modifications. BMI is is above average; BMI management plan is completed. We discussed portion control and increasing exercise.      Physical Exam  Vitals and nursing note reviewed.   Constitutional:       General: She is not in acute distress.     Appearance: Normal appearance. She is well-developed.   HENT:      Head: Normocephalic.      Right Ear: Tympanic membrane and ear canal normal. There is no impacted cerumen.      Left Ear: Tympanic membrane and ear canal " normal. There is no impacted cerumen.      Nose: Nose normal.   Eyes:      Conjunctiva/sclera: Conjunctivae normal.      Pupils: Pupils are equal, round, and reactive to light.   Neck:      Vascular: No carotid bruit.   Cardiovascular:      Rate and Rhythm: Normal rate and regular rhythm.      Heart sounds: Normal heart sounds. No murmur heard.  Pulmonary:      Effort: Pulmonary effort is normal. No respiratory distress.      Breath sounds: Normal breath sounds.   Abdominal:      General: Abdomen is flat. Bowel sounds are normal. There is no distension.      Tenderness: There is no abdominal tenderness.   Musculoskeletal:         General: Normal range of motion.   Skin:     General: Skin is warm and dry.      Findings: No rash.   Neurological:      Mental Status: She is alert and oriented to person, place, and time.   Psychiatric:         Behavior: Behavior normal.         Thought Content: Thought content normal.         Judgment: Judgment normal.      Result Review :  The following data was reviewed by: Marjorie Shirley MD on 09/12/2023:  CMP          2/15/2023    09:49 5/30/2023    13:17 9/6/2023    09:48   CMP   Glucose 85  95  90    BUN 10  10  9    Creatinine 0.62  0.71  0.65    EGFR  111.1     Sodium 141  143  141    Potassium 4.6  4.5  4.5    Chloride 104  107  104    Calcium 9.4  9.7  9.7    Total Protein 6.8   7.0    Total Protein  7.6     Albumin 4.4  4.7  4.4    Globulin 2.4   2.6    Globulin  2.9     Total Bilirubin 0.4  0.2  0.5    Alkaline Phosphatase 103  121  126    AST (SGOT) 18  21  22    ALT (SGPT) 25  33  32    Albumin/Globulin Ratio  1.6     BUN/Creatinine Ratio 16.1  14.1  13.8    Anion Gap  12.5       CBC          2/15/2023    09:49 5/30/2023    13:17 9/6/2023    09:48   CBC   WBC 7.26  7.48  6.64    RBC 4.44  4.48  4.82    Hemoglobin 13.2  13.4  14.6    Hematocrit 41.5  40.8  44.0    MCV 93.5  91.1  91.3    MCH 29.7  29.9  30.3    MCHC 31.8  32.8  33.2    RDW 12.1  13.0  13.1    Platelets 335   394  330      Lipid Panel          2/15/2023    09:49 9/6/2023    09:48   Lipid Panel   Total Cholesterol 186  178    Triglycerides 124  113    HDL Cholesterol 66  56    VLDL Cholesterol 22  20    LDL Cholesterol  98  102    LDL/HDL Ratio  1.78                   Assessment and Plan   Diagnoses and all orders for this visit:    1. Health maintenance examination (Primary)    2. Allergic contact dermatitis due to metals  -     triamcinolone (KENALOG) 0.5 % ointment; Apply 1 application  topically to the appropriate area as directed 2 (Two) Times a Day.  Dispense: 30 g; Refill: 2    Here for annual exam, fasting labs reviewed with patient which are improving, immunizations up-to-date, colon cancer screening due at 43, GYN health up-to-date with her gynecologist on Mirena for contraception, cardiovascular screening negative for symptoms, counseled patient to increase vegetable intake, water and 150 minutes of exercise weekly combining weightbearing exercises and aerobic activity.    She will plan to get a flu shot and Tdap this year.  She is welcome to make an appointment at our office just get both of these.  Unfortunately both are out of stock today.  Ideal time for flu shot in the middle of October.       Follow Up   Return in about 1 year (around 9/12/2024), or if symptoms worsen or fail to improve, for Annual Physical with fasting labs prior, OK to do flu + tdap nurse apt in Oct.  Patient was given instructions and counseling regarding her condition or for health maintenance advice. Please see specific information pulled into the AVS if appropriate.     Marjorie Shirley MD

## 2023-11-30 ENCOUNTER — OFFICE VISIT (OUTPATIENT)
Dept: FAMILY MEDICINE CLINIC | Facility: CLINIC | Age: 38
End: 2023-11-30
Payer: COMMERCIAL

## 2023-11-30 VITALS
HEART RATE: 81 BPM | SYSTOLIC BLOOD PRESSURE: 124 MMHG | BODY MASS INDEX: 42.68 KG/M2 | OXYGEN SATURATION: 98 % | DIASTOLIC BLOOD PRESSURE: 82 MMHG | TEMPERATURE: 97.6 F | HEIGHT: 64 IN | WEIGHT: 250 LBS

## 2023-11-30 DIAGNOSIS — R10.9 LEFT FLANK PAIN: ICD-10-CM

## 2023-11-30 DIAGNOSIS — B37.9 YEAST INFECTION: ICD-10-CM

## 2023-11-30 DIAGNOSIS — R30.0 DYSURIA: Primary | ICD-10-CM

## 2023-11-30 LAB
BILIRUB BLD-MCNC: NEGATIVE MG/DL
CLARITY, POC: CLEAR
COLOR UR: ABNORMAL
EXPIRATION DATE: ABNORMAL
GLUCOSE UR STRIP-MCNC: NEGATIVE MG/DL
KETONES UR QL: ABNORMAL
LEUKOCYTE EST, POC: NEGATIVE
Lab: 3702
NITRITE UR-MCNC: NEGATIVE MG/ML
PH UR: 6 [PH] (ref 5–8)
PROT UR STRIP-MCNC: NEGATIVE MG/DL
RBC # UR STRIP: ABNORMAL /UL
SP GR UR: 1.02 (ref 1–1.03)
UROBILINOGEN UR QL: NORMAL

## 2023-11-30 PROCEDURE — 81003 URINALYSIS AUTO W/O SCOPE: CPT | Performed by: FAMILY MEDICINE

## 2023-11-30 PROCEDURE — 99213 OFFICE O/P EST LOW 20 MIN: CPT | Performed by: FAMILY MEDICINE

## 2023-11-30 RX ORDER — SULFAMETHOXAZOLE AND TRIMETHOPRIM 800; 160 MG/1; MG/1
1 TABLET ORAL 2 TIMES DAILY
Qty: 14 TABLET | Refills: 0 | Status: SHIPPED | OUTPATIENT
Start: 2023-11-30 | End: 2023-12-07

## 2023-11-30 RX ORDER — PHENAZOPYRIDINE HYDROCHLORIDE 100 MG/1
100 TABLET, FILM COATED ORAL 3 TIMES DAILY PRN
Qty: 6 TABLET | Refills: 2 | Status: SHIPPED | OUTPATIENT
Start: 2023-11-30

## 2023-11-30 RX ORDER — FLUCONAZOLE 150 MG/1
150 TABLET ORAL ONCE
Qty: 1 TABLET | Refills: 0 | Status: SHIPPED | OUTPATIENT
Start: 2023-11-30 | End: 2023-11-30

## 2023-11-30 NOTE — PROGRESS NOTES
"Chief Complaint  Difficulty Urinating ( Since yesterday has dysuria and not feeling comfortable ,urinary frequency and  lower back left side pain )    Subjective        Danuta Queen presents to [facility]  History of Present Illness  Pleasant 38-year-old female here with dysuria since yesterday, increased frequency, some pain and pain on the lower back.  Her symptoms started mildly yesterday but really today.  She has had 1 UTI that has felt like this in the past and also history of kidney stones, in August and had a normal ultrasound, she has had 1 kidney stone is largest 6 cm.    Objective   Vital Signs:  /82   Pulse 81   Temp 97.6 °F (36.4 °C)   Ht 162.6 cm (64\")   Wt 113 kg (250 lb)   SpO2 98%   BMI 42.91 kg/m²   Estimated body mass index is 42.91 kg/m² as calculated from the following:    Height as of this encounter: 162.6 cm (64\").    Weight as of this encounter: 113 kg (250 lb).         Physical Exam  Vitals and nursing note reviewed.   Constitutional:       General: She is not in acute distress.     Appearance: She is well-developed.   HENT:      Head: Normocephalic.      Nose: Nose normal.   Eyes:      General: No scleral icterus.  Pulmonary:      Effort: Pulmonary effort is normal. No respiratory distress.   Musculoskeletal:         General: Normal range of motion.      Comments: She does have tenderness in the left flank region, but also suprapubic tenderness   Skin:     General: Skin is warm and dry.      Findings: No rash.   Neurological:      Mental Status: She is alert and oriented to person, place, and time.   Psychiatric:         Behavior: Behavior normal.         Thought Content: Thought content normal.         Judgment: Judgment normal.        Result Review :  The following data was reviewed by: Marjorie Shirley MD on 11/30/2023:      Office Visit on 11/30/2023   Component Date Value Ref Range Status    Color 11/30/2023 Dark Yellow  Yellow, Straw, " Dark Yellow, Gini Final    Clarity, UA 11/30/2023 Clear  Clear Final    Specific Gravity  11/30/2023 1.025  1.005 - 1.030 Final    pH, Urine 11/30/2023 6.0  5.0 - 8.0 Final    Leukocytes 11/30/2023 Negative  Negative Final    Nitrite, UA 11/30/2023 Negative  Negative Final    Protein, POC 11/30/2023 Negative  Negative mg/dL Final    Glucose, UA 11/30/2023 Negative  Negative mg/dL Final    Ketones, UA 11/30/2023 Trace (A)  Negative Final    Urobilinogen, UA 11/30/2023 Normal  Normal, 0.2 E.U./dL Final    Bilirubin 11/30/2023 Negative  Negative Final    Blood, UA 11/30/2023 Small (A)  Negative Final    Lot Number 11/30/2023 [id number]   Final    Expiration Date 11/30/2023 1/25   Final       KUB: Performed in the office, interpreted by me: Negative for kidney stone.  Official review by radiology pending.         Assessment and Plan   Diagnoses and all orders for this visit:    1. Dysuria (Primary)  -     POC Urinalysis Dipstick, Automated  -     Urine Culture - Urine, Urine, Clean Catch  -     sulfamethoxazole-trimethoprim (BACTRIM DS,SEPTRA DS) 800-160 MG per tablet; Take 1 tablet by mouth 2 (Two) Times a Day for 7 days.  Dispense: 14 tablet; Refill: 0  -     phenazopyridine (Pyridium) 100 MG tablet; Take 1 tablet by mouth 3 (Three) Times a Day As Needed for Bladder Spasms.  Dispense: 6 tablet; Refill: 2    2. Left flank pain  -     XR Abdomen KUB  -     sulfamethoxazole-trimethoprim (BACTRIM DS,SEPTRA DS) 800-160 MG per tablet; Take 1 tablet by mouth 2 (Two) Times a Day for 7 days.  Dispense: 14 tablet; Refill: 0    3. Yeast infection  -     fluconazole (Diflucan) 150 MG tablet; Take 1 tablet by mouth 1 (One) Time for 1 dose.  Dispense: 1 tablet; Refill: 0    Pleasant 38-year-old female here for 1 day of dysuria, left flank pain, pain with urination.  Her symptoms are mixed and unclear with regards to UTI versus nephrolithiasis.  KUB negative for stones (her stones in the past have been radiolucent).  I will go ahead and  start treating her as if it is a UTI, 7 days that she is having some upper flank pain although it would be less likely to be pyelonephritis this quickly.     We discussed that if her symptoms are not improving after a few days that it may likely be a kidney stone and to call her urologist, they have ultrasound in office and may be able to get it performed faster than I would be able to schedule.         Follow Up   Return if symptoms worsen or fail to improve.  Patient was given instructions and counseling regarding her condition or for health maintenance advice. Please see specific information pulled into the AVS if appropriate.     Marjorie Shirley MD

## 2023-12-02 LAB
BACTERIA UR CULT: NORMAL
BACTERIA UR CULT: NORMAL

## 2024-09-27 DIAGNOSIS — E66.09 OBESITY DUE TO EXCESS CALORIES, UNSPECIFIED CLASSIFICATION, UNSPECIFIED WHETHER SERIOUS COMORBIDITY PRESENT: Primary | ICD-10-CM

## 2024-09-27 DIAGNOSIS — Z13.220 SCREENING FOR CHOLESTEROL LEVEL: ICD-10-CM

## 2024-09-27 DIAGNOSIS — Z13.29 THYROID DISORDER SCREEN: ICD-10-CM

## 2024-09-27 DIAGNOSIS — Z13.6 SCREENING FOR HEART DISEASE: ICD-10-CM

## 2024-09-30 LAB
ALBUMIN SERPL-MCNC: 4.2 G/DL (ref 3.5–5.2)
ALBUMIN/GLOB SERPL: 1.8 G/DL
ALP SERPL-CCNC: 115 U/L (ref 39–117)
ALT SERPL-CCNC: 26 U/L (ref 1–33)
AST SERPL-CCNC: 20 U/L (ref 1–32)
BASOPHILS # BLD AUTO: 0.06 10*3/MM3 (ref 0–0.2)
BASOPHILS NFR BLD AUTO: 0.9 % (ref 0–1.5)
BILIRUB SERPL-MCNC: 0.3 MG/DL (ref 0–1.2)
BUN SERPL-MCNC: 9 MG/DL (ref 6–20)
BUN/CREAT SERPL: 13.4 (ref 7–25)
CALCIUM SERPL-MCNC: 8.9 MG/DL (ref 8.6–10.5)
CHLORIDE SERPL-SCNC: 105 MMOL/L (ref 98–107)
CHOLEST SERPL-MCNC: 174 MG/DL (ref 0–200)
CO2 SERPL-SCNC: 23.4 MMOL/L (ref 22–29)
CREAT SERPL-MCNC: 0.67 MG/DL (ref 0.57–1)
EGFRCR SERPLBLD CKD-EPI 2021: 112.8 ML/MIN/1.73
EOSINOPHIL # BLD AUTO: 0.26 10*3/MM3 (ref 0–0.4)
EOSINOPHIL NFR BLD AUTO: 3.7 % (ref 0.3–6.2)
ERYTHROCYTE [DISTWIDTH] IN BLOOD BY AUTOMATED COUNT: 12.7 % (ref 12.3–15.4)
GLOBULIN SER CALC-MCNC: 2.3 GM/DL
GLUCOSE SERPL-MCNC: 100 MG/DL (ref 65–99)
HCT VFR BLD AUTO: 42.1 % (ref 34–46.6)
HDLC SERPL-MCNC: 60 MG/DL (ref 40–60)
HGB BLD-MCNC: 13.7 G/DL (ref 12–15.9)
IMM GRANULOCYTES # BLD AUTO: 0.02 10*3/MM3 (ref 0–0.05)
IMM GRANULOCYTES NFR BLD AUTO: 0.3 % (ref 0–0.5)
LDLC SERPL CALC-MCNC: 99 MG/DL (ref 0–100)
LDLC/HDLC SERPL: 1.63 {RATIO}
LYMPHOCYTES # BLD AUTO: 1.16 10*3/MM3 (ref 0.7–3.1)
LYMPHOCYTES NFR BLD AUTO: 16.5 % (ref 19.6–45.3)
MCH RBC QN AUTO: 31.2 PG (ref 26.6–33)
MCHC RBC AUTO-ENTMCNC: 32.5 G/DL (ref 31.5–35.7)
MCV RBC AUTO: 95.9 FL (ref 79–97)
MONOCYTES # BLD AUTO: 0.47 10*3/MM3 (ref 0.1–0.9)
MONOCYTES NFR BLD AUTO: 6.7 % (ref 5–12)
NEUTROPHILS # BLD AUTO: 5.05 10*3/MM3 (ref 1.7–7)
NEUTROPHILS NFR BLD AUTO: 71.9 % (ref 42.7–76)
NRBC BLD AUTO-RTO: 0 /100 WBC (ref 0–0.2)
PLATELET # BLD AUTO: 345 10*3/MM3 (ref 140–450)
POTASSIUM SERPL-SCNC: 4.3 MMOL/L (ref 3.5–5.2)
PROT SERPL-MCNC: 6.5 G/DL (ref 6–8.5)
RBC # BLD AUTO: 4.39 10*6/MM3 (ref 3.77–5.28)
SODIUM SERPL-SCNC: 138 MMOL/L (ref 136–145)
T4 FREE SERPL-MCNC: 1.04 NG/DL (ref 0.92–1.68)
TRIGL SERPL-MCNC: 82 MG/DL (ref 0–150)
TSH SERPL DL<=0.005 MIU/L-ACNC: 1.9 UIU/ML (ref 0.27–4.2)
VLDLC SERPL CALC-MCNC: 15 MG/DL (ref 5–40)
WBC # BLD AUTO: 7.02 10*3/MM3 (ref 3.4–10.8)

## 2024-10-07 ENCOUNTER — OFFICE VISIT (OUTPATIENT)
Dept: FAMILY MEDICINE CLINIC | Facility: CLINIC | Age: 39
End: 2024-10-07
Payer: COMMERCIAL

## 2024-10-07 VITALS
DIASTOLIC BLOOD PRESSURE: 86 MMHG | SYSTOLIC BLOOD PRESSURE: 128 MMHG | HEART RATE: 78 BPM | BODY MASS INDEX: 44.56 KG/M2 | TEMPERATURE: 97.6 F | OXYGEN SATURATION: 97 % | HEIGHT: 64 IN | WEIGHT: 261 LBS

## 2024-10-07 DIAGNOSIS — Z13.0 SCREENING, ANEMIA, DEFICIENCY, IRON: ICD-10-CM

## 2024-10-07 DIAGNOSIS — Z23 NEED FOR VACCINATION: ICD-10-CM

## 2024-10-07 DIAGNOSIS — Z00.00 HEALTH MAINTENANCE EXAMINATION: Primary | ICD-10-CM

## 2024-10-07 DIAGNOSIS — Z13.6 SCREENING FOR HEART DISEASE: ICD-10-CM

## 2024-10-07 DIAGNOSIS — Z13.29 THYROID DISORDER SCREEN: ICD-10-CM

## 2024-10-07 DIAGNOSIS — Z13.1 SCREENING FOR DIABETES MELLITUS: ICD-10-CM

## 2024-10-07 PROCEDURE — 90471 IMMUNIZATION ADMIN: CPT | Performed by: FAMILY MEDICINE

## 2024-10-07 PROCEDURE — 99396 PREV VISIT EST AGE 40-64: CPT | Performed by: FAMILY MEDICINE

## 2024-10-07 PROCEDURE — 90656 IIV3 VACC NO PRSV 0.5 ML IM: CPT | Performed by: FAMILY MEDICINE

## 2024-10-07 NOTE — PROGRESS NOTES
"Chief Complaint  Annual Exam (No complains pt doing well )    Subjective        Danuta Queen presents to [facility]  History of Present Illness  Annual Exam.    Health Maintenance   Topic Date Due    COVID-19 Vaccine (5 - 2023-24 season) 09/01/2024    ANNUAL PHYSICAL  10/07/2025    BMI FOLLOWUP  10/07/2025    PAP SMEAR  12/02/2025    MAMMOGRAM  02/19/2026    TDAP/TD VACCINES (3 - Td or Tdap) 09/13/2033    HEPATITIS C SCREENING  Completed    INFLUENZA VACCINE  Completed    Pneumococcal Vaccine 0-64  Aged Out     Diet: eating healthy over all, uses meal kits and makes it easy to portion   Exercise: rowing, wanting to exercise more often, does better at home   GYN: UTD with GYN - pap and mammo   Immunizations: UTD   Colon cancer screening: UTD   Sleep/mental health: doing well   Dentist: UTD   Vision: UTD  Derm: UTD - had a check this year    Goals: to make travels easier - being more active and less out of breath    Objective   Vital Signs:  /86   Pulse 78   Temp 97.6 °F (36.4 °C)   Ht 162.6 cm (64\")   Wt 118 kg (261 lb)   SpO2 97%   BMI 44.80 kg/m²   Estimated body mass index is 44.8 kg/m² as calculated from the following:    Height as of this encounter: 162.6 cm (64\").    Weight as of this encounter: 118 kg (261 lb).    Class 3 Severe Obesity (BMI >=40). Obesity-related health conditions include the following: none. Obesity is unchanged. BMI is is above average; BMI management plan is completed. We discussed portion control and increasing exercise.      Physical Exam  Vitals and nursing note reviewed.   Constitutional:       General: She is not in acute distress.     Appearance: Normal appearance. She is well-developed.   HENT:      Head: Normocephalic.      Right Ear: Tympanic membrane and ear canal normal. There is no impacted cerumen.      Left Ear: Tympanic membrane and ear canal normal. There is no impacted cerumen.      Nose: Nose normal.   Eyes:      " Conjunctiva/sclera: Conjunctivae normal.      Pupils: Pupils are equal, round, and reactive to light.   Neck:      Vascular: No carotid bruit.   Cardiovascular:      Rate and Rhythm: Normal rate and regular rhythm.      Heart sounds: Normal heart sounds. No murmur heard.  Pulmonary:      Effort: Pulmonary effort is normal. No respiratory distress.      Breath sounds: Normal breath sounds.   Abdominal:      General: Abdomen is flat. Bowel sounds are normal. There is no distension.      Tenderness: There is no abdominal tenderness.   Musculoskeletal:         General: Normal range of motion.   Skin:     General: Skin is warm and dry.      Findings: No rash.   Neurological:      Mental Status: She is alert and oriented to person, place, and time.   Psychiatric:         Behavior: Behavior normal.         Thought Content: Thought content normal.         Judgment: Judgment normal.        Result Review :  The following data was reviewed by: Marjorie Shirley MD on 10/07/2024:         Lipid Panel With LDL / HDL Ratio (09/30/2024 08:30)  Comprehensive Metabolic Panel (09/30/2024 08:30)  CBC & Differential (09/30/2024 08:30)  T4, Free (09/30/2024 08:30)  TSH (09/30/2024 08:30)     Assessment and Plan   Diagnoses and all orders for this visit:    1. Health maintenance examination (Primary)    2. Need for vaccination  -     Fluzone >6mos ([id number])    3. Screening for heart disease  -     Lipid Panel; Future    4. Thyroid disorder screen  -     TSH Rfx On Abnormal To Free T4; Future    5. Screening for diabetes mellitus  -     Comprehensive Metabolic Panel; Future  -     Hemoglobin A1c; Future    6. Screening, anemia, deficiency, iron  -     CBC & Differential; Future    Here for annual exam, fasting labs reviewed with patient, immunizations up-to-date, colon cancer screening not indicated until 45, GYN health up-to-date, cardiovascular screening negative for symptoms, counseled patient to increase vegetable intake, water and 150  minutes of exercise weekly combining weightbearing exercises and aerobic activity.           Follow Up   Return in about 1 year (around 10/7/2025), or if symptoms worsen or fail to improve, for Annual Physical with fasting labs prior.  Patient was given instructions and counseling regarding her condition or for health maintenance advice. Please see specific information pulled into the AVS if appropriate.

## 2025-08-07 ENCOUNTER — OFFICE VISIT (OUTPATIENT)
Dept: FAMILY MEDICINE CLINIC | Facility: CLINIC | Age: 40
End: 2025-08-07
Payer: COMMERCIAL

## 2025-08-07 VITALS
SYSTOLIC BLOOD PRESSURE: 126 MMHG | WEIGHT: 271 LBS | BODY MASS INDEX: 46.26 KG/M2 | OXYGEN SATURATION: 98 % | TEMPERATURE: 97.6 F | DIASTOLIC BLOOD PRESSURE: 84 MMHG | HEART RATE: 83 BPM | HEIGHT: 64 IN

## 2025-08-07 DIAGNOSIS — E66.813 CLASS 3 SEVERE OBESITY DUE TO EXCESS CALORIES WITHOUT SERIOUS COMORBIDITY WITH BODY MASS INDEX (BMI) OF 45.0 TO 49.9 IN ADULT: Primary | ICD-10-CM

## 2025-08-07 PROCEDURE — 99214 OFFICE O/P EST MOD 30 MIN: CPT | Performed by: FAMILY MEDICINE

## 2025-08-07 RX ORDER — TIRZEPATIDE 2.5 MG/.5ML
2.5 INJECTION, SOLUTION SUBCUTANEOUS WEEKLY
Qty: 2 ML | Refills: 6 | Status: SHIPPED | OUTPATIENT
Start: 2025-08-07

## 2025-08-29 DIAGNOSIS — E66.813 CLASS 3 SEVERE OBESITY DUE TO EXCESS CALORIES WITHOUT SERIOUS COMORBIDITY WITH BODY MASS INDEX (BMI) OF 45.0 TO 49.9 IN ADULT: ICD-10-CM

## 2025-08-29 RX ORDER — TIRZEPATIDE 2.5 MG/.5ML
2.5 INJECTION, SOLUTION SUBCUTANEOUS WEEKLY
Qty: 2 ML | Refills: 6 | Status: SHIPPED | OUTPATIENT
Start: 2025-08-29

## (undated) DEVICE — GLV SURG SENSICARE POLYISPRN W/ALOE PF LF 6.5 GRN STRL

## (undated) DEVICE — DISPOSABLE MONOPOLAR ENDOSCOPIC CORD 10 FT. (3M): Brand: KIRWAN

## (undated) DEVICE — APPL CHLORAPREP HI/LITE 26ML ORNG

## (undated) DEVICE — ENDOCUT SCISSOR TIP, DISPOSABLE: Brand: RENEW

## (undated) DEVICE — SOL NACL 0.9PCT 1000ML

## (undated) DEVICE — ENDOPOUCH RETRIEVER SPECIMEN RETRIEVAL BAGS: Brand: ENDOPOUCH RETRIEVER

## (undated) DEVICE — EXTENSION SET, MALE LUER LOCK ADAPTER WITH RETRACTABLE COLLAR

## (undated) DEVICE — LAPAROVUE VISIBILITY SYSTEM LAPAROSCOPIC SOLUTIONS: Brand: LAPAROVUE

## (undated) DEVICE — ENDOPATH XCEL BLUNT TIP TROCARS WITH SMOOTH SLEEVES: Brand: ENDOPATH XCEL

## (undated) DEVICE — ADHS SKIN SURG TISS VISC PREMIERPRO EXOFIN HI/VISC FAST/DRY

## (undated) DEVICE — DRAPE,REIN 53X77,STERILE: Brand: MEDLINE

## (undated) DEVICE — GLV SURG BIOGEL LTX PF 6

## (undated) DEVICE — CATH IV INSYTE AUTOGARD 14G 1 1/2IN ORNG

## (undated) DEVICE — ENDOPATH XCEL UNIVERSAL TROCAR STABLILITY SLEEVES: Brand: ENDOPATH XCEL

## (undated) DEVICE — STPCK 3WY D201 DISCOFIX

## (undated) DEVICE — CONTAINER,SPECIMEN,OR STERILE,4OZ: Brand: MEDLINE

## (undated) DEVICE — DRP C/ARM 41X74IN

## (undated) DEVICE — ANTIBACTERIAL UNDYED BRAIDED (POLYGLACTIN 910), SYNTHETIC ABSORBABLE SUTURE: Brand: COATED VICRYL

## (undated) DEVICE — LOU LAP CHOLE: Brand: MEDLINE INDUSTRIES, INC.

## (undated) DEVICE — SUT VIC 0/0 UR6 27IN DYED J603H

## (undated) DEVICE — CATH CHOLANG 4.5F18IN BRGNDY

## (undated) DEVICE — ENDOPATH XCEL BLADELESS TROCARS WITH STABILITY SLEEVES: Brand: ENDOPATH XCEL

## (undated) DEVICE — GOWN,SIRUS,NON REINFRCD,LARGE,SET IN SL: Brand: MEDLINE